# Patient Record
Sex: MALE | Race: WHITE | NOT HISPANIC OR LATINO | URBAN - METROPOLITAN AREA
[De-identification: names, ages, dates, MRNs, and addresses within clinical notes are randomized per-mention and may not be internally consistent; named-entity substitution may affect disease eponyms.]

---

## 2017-01-17 ENCOUNTER — OUTPATIENT (OUTPATIENT)
Dept: OUTPATIENT SERVICES | Facility: HOSPITAL | Age: 57
LOS: 1 days | Discharge: HOME | End: 2017-01-17

## 2017-06-27 DIAGNOSIS — R41.3 OTHER AMNESIA: ICD-10-CM

## 2017-06-27 DIAGNOSIS — E78.5 HYPERLIPIDEMIA, UNSPECIFIED: ICD-10-CM

## 2017-06-27 DIAGNOSIS — R53.81 OTHER MALAISE: ICD-10-CM

## 2018-11-02 ENCOUNTER — OUTPATIENT (OUTPATIENT)
Dept: OUTPATIENT SERVICES | Facility: HOSPITAL | Age: 58
LOS: 1 days | Discharge: HOME | End: 2018-11-02

## 2018-11-02 DIAGNOSIS — M15.9 POLYOSTEOARTHRITIS, UNSPECIFIED: ICD-10-CM

## 2018-11-02 DIAGNOSIS — R35.1 NOCTURIA: ICD-10-CM

## 2018-11-02 DIAGNOSIS — R39.198 OTHER DIFFICULTIES WITH MICTURITION: ICD-10-CM

## 2018-11-02 DIAGNOSIS — E78.5 HYPERLIPIDEMIA, UNSPECIFIED: ICD-10-CM

## 2018-11-02 DIAGNOSIS — J30.9 ALLERGIC RHINITIS, UNSPECIFIED: ICD-10-CM

## 2018-11-02 DIAGNOSIS — C44.91 BASAL CELL CARCINOMA OF SKIN, UNSPECIFIED: ICD-10-CM

## 2018-11-02 DIAGNOSIS — R41.3 OTHER AMNESIA: ICD-10-CM

## 2018-11-02 DIAGNOSIS — H91.92 UNSPECIFIED HEARING LOSS, LEFT EAR: ICD-10-CM

## 2018-11-02 DIAGNOSIS — M51.26 OTHER INTERVERTEBRAL DISC DISPLACEMENT, LUMBAR REGION: ICD-10-CM

## 2019-02-08 ENCOUNTER — OUTPATIENT (OUTPATIENT)
Dept: OUTPATIENT SERVICES | Facility: HOSPITAL | Age: 59
LOS: 1 days | Discharge: HOME | End: 2019-02-08

## 2019-02-08 DIAGNOSIS — J30.9 ALLERGIC RHINITIS, UNSPECIFIED: ICD-10-CM

## 2019-02-08 DIAGNOSIS — R41.3 OTHER AMNESIA: ICD-10-CM

## 2019-02-08 DIAGNOSIS — R39.198 OTHER DIFFICULTIES WITH MICTURITION: ICD-10-CM

## 2019-02-08 DIAGNOSIS — D64.9 ANEMIA, UNSPECIFIED: ICD-10-CM

## 2019-02-08 DIAGNOSIS — R53.83 OTHER FATIGUE: ICD-10-CM

## 2019-02-08 DIAGNOSIS — E78.5 HYPERLIPIDEMIA, UNSPECIFIED: ICD-10-CM

## 2019-02-08 DIAGNOSIS — H91.92 UNSPECIFIED HEARING LOSS, LEFT EAR: ICD-10-CM

## 2019-02-08 DIAGNOSIS — I10 ESSENTIAL (PRIMARY) HYPERTENSION: ICD-10-CM

## 2019-02-08 DIAGNOSIS — C44.91 BASAL CELL CARCINOMA OF SKIN, UNSPECIFIED: ICD-10-CM

## 2020-08-25 ENCOUNTER — INPATIENT (INPATIENT)
Facility: HOSPITAL | Age: 60
LOS: 2 days | Discharge: HOME | End: 2020-08-28
Attending: INTERNAL MEDICINE | Admitting: INTERNAL MEDICINE
Payer: COMMERCIAL

## 2020-08-25 VITALS
HEART RATE: 69 BPM | RESPIRATION RATE: 18 BRPM | OXYGEN SATURATION: 99 % | WEIGHT: 205.03 LBS | SYSTOLIC BLOOD PRESSURE: 162 MMHG | TEMPERATURE: 98 F | DIASTOLIC BLOOD PRESSURE: 75 MMHG

## 2020-08-25 DIAGNOSIS — Z87.81 PERSONAL HISTORY OF (HEALED) TRAUMATIC FRACTURE: Chronic | ICD-10-CM

## 2020-08-25 LAB
ALBUMIN SERPL ELPH-MCNC: 4.6 G/DL — SIGNIFICANT CHANGE UP (ref 3.5–5.2)
ALP SERPL-CCNC: 27 U/L — LOW (ref 30–115)
ALT FLD-CCNC: 21 U/L — SIGNIFICANT CHANGE UP (ref 0–41)
ANION GAP SERPL CALC-SCNC: 11 MMOL/L — SIGNIFICANT CHANGE UP (ref 7–14)
APTT BLD: 29.3 SEC — SIGNIFICANT CHANGE UP (ref 27–39.2)
AST SERPL-CCNC: 24 U/L — SIGNIFICANT CHANGE UP (ref 0–41)
BASOPHILS # BLD AUTO: 0.04 K/UL — SIGNIFICANT CHANGE UP (ref 0–0.2)
BASOPHILS NFR BLD AUTO: 0.7 % — SIGNIFICANT CHANGE UP (ref 0–1)
BILIRUB SERPL-MCNC: 0.8 MG/DL — SIGNIFICANT CHANGE UP (ref 0.2–1.2)
BUN SERPL-MCNC: 18 MG/DL — SIGNIFICANT CHANGE UP (ref 10–20)
CALCIUM SERPL-MCNC: 9.1 MG/DL — SIGNIFICANT CHANGE UP (ref 8.5–10.1)
CHLORIDE SERPL-SCNC: 104 MMOL/L — SIGNIFICANT CHANGE UP (ref 98–110)
CO2 SERPL-SCNC: 24 MMOL/L — SIGNIFICANT CHANGE UP (ref 17–32)
CREAT SERPL-MCNC: 1.1 MG/DL — SIGNIFICANT CHANGE UP (ref 0.7–1.5)
EOSINOPHIL # BLD AUTO: 0.06 K/UL — SIGNIFICANT CHANGE UP (ref 0–0.7)
EOSINOPHIL NFR BLD AUTO: 1.1 % — SIGNIFICANT CHANGE UP (ref 0–8)
ERYTHROCYTE [SEDIMENTATION RATE] IN BLOOD: 8 MM/HR — SIGNIFICANT CHANGE UP (ref 0–10)
GLUCOSE SERPL-MCNC: 114 MG/DL — HIGH (ref 70–99)
HCT VFR BLD CALC: 41.9 % — LOW (ref 42–52)
HGB BLD-MCNC: 13.9 G/DL — LOW (ref 14–18)
IMM GRANULOCYTES NFR BLD AUTO: 0.4 % — HIGH (ref 0.1–0.3)
INR BLD: 0.9 RATIO — SIGNIFICANT CHANGE UP (ref 0.65–1.3)
LYMPHOCYTES # BLD AUTO: 1.93 K/UL — SIGNIFICANT CHANGE UP (ref 1.2–3.4)
LYMPHOCYTES # BLD AUTO: 36.1 % — SIGNIFICANT CHANGE UP (ref 20.5–51.1)
MCHC RBC-ENTMCNC: 30.3 PG — SIGNIFICANT CHANGE UP (ref 27–31)
MCHC RBC-ENTMCNC: 33.2 G/DL — SIGNIFICANT CHANGE UP (ref 32–37)
MCV RBC AUTO: 91.5 FL — SIGNIFICANT CHANGE UP (ref 80–94)
MONOCYTES # BLD AUTO: 0.51 K/UL — SIGNIFICANT CHANGE UP (ref 0.1–0.6)
MONOCYTES NFR BLD AUTO: 9.6 % — HIGH (ref 1.7–9.3)
NEUTROPHILS # BLD AUTO: 2.78 K/UL — SIGNIFICANT CHANGE UP (ref 1.4–6.5)
NEUTROPHILS NFR BLD AUTO: 52.1 % — SIGNIFICANT CHANGE UP (ref 42.2–75.2)
NRBC # BLD: 0 /100 WBCS — SIGNIFICANT CHANGE UP (ref 0–0)
PLATELET # BLD AUTO: 165 K/UL — SIGNIFICANT CHANGE UP (ref 130–400)
POTASSIUM SERPL-MCNC: 4.1 MMOL/L — SIGNIFICANT CHANGE UP (ref 3.5–5)
POTASSIUM SERPL-SCNC: 4.1 MMOL/L — SIGNIFICANT CHANGE UP (ref 3.5–5)
PROT SERPL-MCNC: 7 G/DL — SIGNIFICANT CHANGE UP (ref 6–8)
PROTHROM AB SERPL-ACNC: 10.4 SEC — SIGNIFICANT CHANGE UP (ref 9.95–12.87)
RBC # BLD: 4.58 M/UL — LOW (ref 4.7–6.1)
RBC # FLD: 12.6 % — SIGNIFICANT CHANGE UP (ref 11.5–14.5)
SARS-COV-2 RNA SPEC QL NAA+PROBE: SIGNIFICANT CHANGE UP
SODIUM SERPL-SCNC: 139 MMOL/L — SIGNIFICANT CHANGE UP (ref 135–146)
TROPONIN T SERPL-MCNC: <0.01 NG/ML — SIGNIFICANT CHANGE UP
WBC # BLD: 5.34 K/UL — SIGNIFICANT CHANGE UP (ref 4.8–10.8)
WBC # FLD AUTO: 5.34 K/UL — SIGNIFICANT CHANGE UP (ref 4.8–10.8)

## 2020-08-25 PROCEDURE — 70450 CT HEAD/BRAIN W/O DYE: CPT | Mod: 26,59

## 2020-08-25 PROCEDURE — 99285 EMERGENCY DEPT VISIT HI MDM: CPT

## 2020-08-25 PROCEDURE — 70498 CT ANGIOGRAPHY NECK: CPT | Mod: 26

## 2020-08-25 PROCEDURE — 70496 CT ANGIOGRAPHY HEAD: CPT | Mod: 26

## 2020-08-25 PROCEDURE — 93010 ELECTROCARDIOGRAM REPORT: CPT

## 2020-08-25 PROCEDURE — 70551 MRI BRAIN STEM W/O DYE: CPT | Mod: 26

## 2020-08-25 RX ORDER — ONDANSETRON 8 MG/1
4 TABLET, FILM COATED ORAL ONCE
Refills: 0 | Status: COMPLETED | OUTPATIENT
Start: 2020-08-25 | End: 2020-08-25

## 2020-08-25 RX ORDER — ENOXAPARIN SODIUM 100 MG/ML
40 INJECTION SUBCUTANEOUS DAILY
Refills: 0 | Status: DISCONTINUED | OUTPATIENT
Start: 2020-08-25 | End: 2020-08-27

## 2020-08-25 RX ORDER — SIMVASTATIN 20 MG/1
40 TABLET, FILM COATED ORAL AT BEDTIME
Refills: 0 | Status: DISCONTINUED | OUTPATIENT
Start: 2020-08-25 | End: 2020-08-28

## 2020-08-25 RX ORDER — LOSARTAN POTASSIUM 100 MG/1
50 TABLET, FILM COATED ORAL DAILY
Refills: 0 | Status: DISCONTINUED | OUTPATIENT
Start: 2020-08-25 | End: 2020-08-28

## 2020-08-25 RX ORDER — MECLIZINE HCL 12.5 MG
50 TABLET ORAL ONCE
Refills: 0 | Status: COMPLETED | OUTPATIENT
Start: 2020-08-25 | End: 2020-08-25

## 2020-08-25 RX ADMIN — Medication 50 MILLIGRAM(S): at 08:16

## 2020-08-25 RX ADMIN — ENOXAPARIN SODIUM 40 MILLIGRAM(S): 100 INJECTION SUBCUTANEOUS at 11:53

## 2020-08-25 RX ADMIN — SIMVASTATIN 40 MILLIGRAM(S): 20 TABLET, FILM COATED ORAL at 21:55

## 2020-08-25 RX ADMIN — ONDANSETRON 4 MILLIGRAM(S): 8 TABLET, FILM COATED ORAL at 07:01

## 2020-08-25 NOTE — ED PROVIDER NOTE - PHYSICAL EXAMINATION
CONSTITUTIONAL: well developed, well nourished, in no acute distress, speaking in full sentences, nontoxic appearing  SKIN: warm, dry, no rash  HEAD: normocephalic, atraumatic  EYES: PERRL at 3mm, EOMI, no conjunctival erythema, no nystagmus  ENT: patent airway, moist mucous membranes, no tongue deviation  NECK: supple, no masses, full flexion/extension without pain  CV:  regular rate, regular rhythm, 2+ radial pulses bilaterally  RESP: no wheezes, no rales, no rhonchi, normal work of breathing  ABD: soft, nontender, nondistended, no rebound, no guarding  MSK: normal ROM, no cyanosis, no edema  NEURO: alert, oriented, CN 2-12 grossly intact, mildly decreased sensation to R face/RLE, symmetric sensation in BUE, 5/5 motor strength in all extremities, no pronator drift, no facial asymmetry, normal gait  PSYCH: cooperative, appropriate

## 2020-08-25 NOTE — ED PROVIDER NOTE - OBJECTIVE STATEMENT
59 yo M with PMHx of HLD, HTN who presents with intermittent dizziness, nausea, mild R sided tingling which started at 3am while at work. No alleviating/aggravating factors. No headache, blurry vision, slurred speech, cp, sob, unilateral weakness, recent head trauma. No blood thinners. No hx of CVA or TIA.    PMD: Dr. Bhakta 59 yo M with PMHx of HLD, HTN who presents with intermittent dizziness, nausea, mild R sided tingling which started at 3am while at work. No alleviating/aggravating factors. No headache, blurry vision, slurred speech, cp, sob, vomiting, unilateral weakness, recent head trauma. No blood thinners. No hx of CVA or TIA.    PMD: Dr. Bhakta

## 2020-08-25 NOTE — H&P ADULT - NSHPLABSRESULTS_GEN_ALL_CORE
13.9   5.34  )-----------( 165      ( 25 Aug 2020 06:40 )             41.9       08-25    139  |  104  |  18  ----------------------------<  114<H>  4.1   |  24  |  1.1    Ca    9.1      25 Aug 2020 06:40    TPro  7.0  /  Alb  4.6  /  TBili  0.8  /  DBili  x   /  AST  24  /  ALT  21  /  AlkPhos  27<L>  08-25                  PT/INR - ( 25 Aug 2020 06:40 )   PT: 10.40 sec;   INR: 0.90 ratio         PTT - ( 25 Aug 2020 06:40 )  PTT:29.3 sec    Lactate Trend      CARDIAC MARKERS ( 25 Aug 2020 06:40 )  x     / <0.01 ng/mL / x     / x     / x          < from: CT Angio Neck w/ IV Cont (08.25.20 @ 07:05) >    CTA OF THE HEAD AND NECK    TECHNIQUE:    Following the intravenous administration of 100 cc of Optiray non-ionic contrast, CTA of the head and neck was performed. Sagittal and coronal reformatted images and 3-D volume rendering imageswere submitted for interpretation.    Combined studies combined reports    FINDINGS:.    Neck:    The origins of the great vessels are patent.    The common carotid arteries, external carotid arteries, and vertebral arteries are patent.    The left internal carotid arteries patent. Minimal vascular calcifications are noted along the posterior wall of the proximal left internal carotid artery without vascular stenosis.    There is a short segment of mild stenosis (less than 50%) involving the proximal right internal carotid artery with vascular calcifications along the posterior wall.    Head:    There are no focal areas of narrowing or dilatation of the intracranial arterial circulation.    Other:    Calcifications within the tonsillar pillars consistent with previous inflammation or infection.    Degenerative changes of the cervical spine. 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    IMPRESSION:    1.  Right internal carotid artery;proximal, short segment mild stenosis (less than 50%) with vascular calcifications.    2.  Unremarkable CTA of the head.    < end of copied text >      ?< from: CT Brain Stroke Protocol (08.25.20 @ 06:55) >    INTERPRETATION:  CLINICAL HISTORY / REASON FOR EXAM: Stroke code, intermittent dizziness, right sided tingling    TECHNIQUE: Multiple contiguous axial CT images of the brain were obtained from base to vertex without administration of intravenous contrast. Sagittal and coronal reformatted images were submitted.    COMPARISON: None    FINDINGS:    The third and lateral ventricles are mildly enlarged as are the cortical sulci consistent with a mild degree of cortical atrophy. The fourth ventricle is normal in size and position.    Gray-white differentiation is maintained.    ASPECT SCORE: 10.    There is no shift of the midline structures, depressed skull fracture or acute intracranial hemorrhage.    Mucosal thickening in maxillary sinuses.    IMPRESSION:    No mass effect or intracranial hemorrhage. No CT evidence of acute large territorial infarction    < end of copied text >

## 2020-08-25 NOTE — ED PROVIDER NOTE - PROGRESS NOTE DETAILS
TC: Code stroke activated in triage. TC: 61 yo M with PMHx of HLD, HTN who presents with dizziness, nausea, RUE/RLE tingling sensation since 3am today. Code stroke activated in triage. Here in ED, vitals wnl. Mildly decreased sensation on R face and RLE but sensation symmetric in RUE/LUE. NIHSS 1 for mildly decreased sensation. Ordered labs, ekg, cxr, CT, CTA. Signed out to Dr. Johnston. TC: 61 yo M with PMHx of HLD, HTN who presents with dizziness, nausea, R sided tingling sensation since 3am today. Code stroke activated in triage. Here in ED, vitals wnl. Mildly decreased sensation on R face and RLE but sensation symmetric in RUE/LUE. NIHSS 1 for mildly decreased sensation. Ordered labs, ekg, cxr, CT, CTA. Given zofran for nausea. Signed out to Dr. Johnston. NN: Pt resting comfortably, pending Neuro + CT read TC: Spoke with neuro Dr. Bautista who stated pt is not a tPA candidate given NIHSS 1. If rest of workup negative, likely placement in obs for TIA workup/MRI. Pending official CT/CTA read. Patient reports feeling better, still mildly dizzy, neuroimaging is negative. Patient reports feeling better, still mildly dizzy, neuroimaging is negative. Will try Meclizine. Dr. Bhakta called x3, no answer will transfer care to hospitalist

## 2020-08-25 NOTE — CONSULT NOTE ADULT - ASSESSMENT
61 yo M with PMHx of HLD, HTN presented with intermittent dizziness and numbness more on the right side. Code stroke was called, NIHSS is 1, CTH Is unremarkable CTA shows no LVO. His symptoms are improving.       Plan:  MRI brain NC  TTE  telemetry  Lipid profile, HA1C          Neuroattending note will follow 61 yo M with PMHx of HLD, HTN presented with intermittent dizziness and numbness more on the right side. Code stroke was called, NIHSS is 1, CTH Is unremarkable CTA shows no LVO. His symptoms are improving.       Plan:  MRI brain NC  TTE  telemetry  Lipid profile, HA1C  Aspirin 81          Neuroattending note will follow

## 2020-08-25 NOTE — H&P ADULT - ATTENDING COMMENTS
Patient seen in the ER on August 25. Patient known to me with a PMH of HTN, HLD, presented to the ER from work with dizziness, inability to recall names and gait instability. Work up in progress, awaits MRI and neuro evaluation. Continue supportive measures

## 2020-08-25 NOTE — ED PROVIDER NOTE - ATTENDING CONTRIBUTION TO CARE
61 yo male PMH as noted c/o " difficulties thinking " for 4 days, last night he developed feeling of dizziness worse with change in position, associated with tingling in " everywhere".  Denies any HA, neck pain, CP, SOB, N/V, abdominal pain, no focal weakness or slurring of speech.  Well-appearing male, NAD, PERRL, nml work of breathing, lungs CTA b/l, RRR, well-perfuse extremities, distal pulses intact, abdomen soft, NTND, no leg edema or calf ttp. A&Ox3, nml speech, no focal motor or sensory deficits, no past pointing or pronator drift, no limb ataxia.  Stroke code called on arrival, will follow neurology recommendations regarding work up and disposition.

## 2020-08-25 NOTE — H&P ADULT - ASSESSMENT
61 yo M PMHx HTN, DLD presented for dizziness    # r/o stroke  - NIHSS 1, stroke code called, no need for tPA  - f/u neuro recs  - CT head , CTA head and neck unremarkable for stoke  - f/u MRI   - f/u HbA1c, lipid panel  - PT eval      # DLD  - c/w statin  - f/u lipid panel    #HTN  - monitor BP           # diet : DASH  # DVT ppx: lovenox  # GI ppx: not needed   # full code 61 yo M PMHx HTN, DLD presented for dizziness    # r/o stroke  - NIHSS 1, stroke code called, no need for tPA  - f/u neuro recs  - CT head , CTA head and neck unremarkable for stoke  - f/u MRI   - f/u HbA1c, lipid panel  - PT eval      # DLD  - c/w satin   - f/u lipid panel    #HTN  - c/w losartan 50 OD           # diet : DASH  # DVT ppx: lovenox  # GI ppx: not needed   # full code

## 2020-08-25 NOTE — ED ADULT TRIAGE NOTE - CHIEF COMPLAINT QUOTE
pt with c/o dizziness and numbness/tingling with decreased sensation to right arm & right leg  symptoms started at 3am

## 2020-08-25 NOTE — ED PROVIDER NOTE - NS ED ROS FT
GEN:  no fever, no chills  NEURO:  no headache, + dizziness  ENT: no sore throat, no runny nose  CV:  no chest pain, no palpitations  RESP:  no sob, no cough  GI:  + nausea, no vomiting, no abdominal pain, no diarrhea  :  no dysuria, no urinary frequency, no hematuria  MSK:  no joint pain, no edema  SKIN:  no rash, no bruising  HEME: no hematochezia, no melena

## 2020-08-25 NOTE — ED PROVIDER NOTE - CLINICAL SUMMARY MEDICAL DECISION MAKING FREE TEXT BOX
61 yo male with dizziness and paresthesias, stroke code called, not a candidate for tPA, neuroimaging negative, admit for further work up as recommended by neurology service.

## 2020-08-25 NOTE — CONSULT NOTE ADULT - SUBJECTIVE AND OBJECTIVE BOX
Stroke Consult Note:    MILANA LA    1. Chief Complaint: dizziness, right sided numbness    HPI:  59 yo M with PMHx of HLD, HTN presented with intermittent dizziness. Hx goes back to 3 am this morning when th patient felt dizzy at work , ass/ w/ headache,  nausea and b/l upper and lower ext numbness . He denies any LOC, blurry vision, slurred speech, palpitation, sob, chest pain , weakness. No recent head trauma, no hx of TIA/CVA or seizures .     In ED , Pt was hemodynamically stable. stroke code called , NIHSS 1 , CT head was unremarkable  . CTA  neck showed Right internal carotid artery; proximal, short segment mild stenosis (less than 50%) with vascular calcifications. CTA head was unremarkable. (25 Aug 2020 09:43)      2. Relevant PMH:   Prior ischemic stroke/TIA[ ], Afib [ ], CAD [ ], HTN [x ], DLD [x ], DM [ ], PVD [ ], Obesity [ ],   Sedentary lifestyle [ ], CHF [ ], NORIS [ ], Cancer Hx [ ].    3. Social History: Smoking [ ], Drug Use [ ], Alcohol Use [ ], Other [ ]    4. Possible Location of Stroke:    5. Relevant Brain Tissue Imagin. Relevant Cerebrovascular Imaging:   CT Angio Neck w/ IV Cont:   EXAM:  CT ANGIO NECK (W)AW IC        EXAM:  CT ANGIO BRAIN (W)AW IC            PROCEDURE DATE:  2020            INTERPRETATION:  Clinical History / Reason for exam: Right sided tingling and dizziness, for evaluation of vascular stenosis, possible stroke.    CTA OF THE HEAD AND NECK    TECHNIQUE:    Following the intravenous administration of 100 cc of Optiray non-ionic contrast, CTA of the head and neck was performed. Sagittal and coronal reformatted images and 3-D volume rendering imageswere submitted for interpretation.    Combined studies combined reports    FINDINGS:.    Neck:    The origins of the great vessels are patent.    The common carotid arteries, external carotid arteries, and vertebral arteries are patent.    The left internal carotid arteries patent. Minimal vascular calcifications are noted along the posterior wall of the proximal left internal carotid artery without vascular stenosis.    There is a short segment of mild stenosis (less than 50%) involving the proximal right internal carotid artery with vascular calcifications along the posterior wall.    Head:    There are no focal areas of narrowing or dilatation of the intracranial arterial circulation.    Other:    Calcifications within the tonsillar pillars consistent with previous inflammation or infection.    Degenerative changes of the cervical spine. 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    IMPRESSION:    1.  Right internal carotid artery;proximal, short segment mild stenosis (less than 50%) with vascular calcifications.    2.  Unremarkable CTA of the head.          GONZALO DAVILA M.D., ATTENDING RADIOLOGIST  This document has been electronically signed. Aug 25 2020  7:43AM             (20 @ 07:05)     CT Angio Head w/ IV Cont:   EXAM:  CT ANGIO NECK (W)AW IC        EXAM:  CT ANGIO BRAIN (W)AW IC            PROCEDURE DATE:  2020            INTERPRETATION:  Clinical History / Reason for exam: Right sided tingling and dizziness, for evaluation of vascular stenosis, possible stroke.    CTA OF THE HEAD AND NECK    TECHNIQUE:    Following the intravenous administration of 100 cc of Optiray non-ionic contrast, CTA of the head and neck was performed. Sagittal and coronal reformatted images and 3-D volume rendering imageswere submitted for interpretation.    Combined studies combined reports    FINDINGS:.    Neck:    The origins of the great vessels are patent.    The common carotid arteries, external carotid arteries, and vertebral arteries are patent.    The left internal carotid arteries patent. Minimal vascular calcifications are noted along the posterior wall of the proximal left internal carotid artery without vascular stenosis.    There is a short segment of mild stenosis (less than 50%) involving the proximal right internal carotid artery with vascular calcifications along the posterior wall.    Head:    There are no focal areas of narrowing or dilatation of the intracranial arterial circulation.    Other:    Calcifications within the tonsillar pillars consistent with previous inflammation or infection.    Degenerative changes of the cervical spine. 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    IMPRESSION:    1.  Right internal carotid artery;proximal, short segment mild stenosis (less than 50%) with vascular calcifications.    2.  Unremarkable CTA of the head.          GONZALO DAVILA M.D., ATTENDING RADIOLOGIST  This document has been electronically signed. Aug 25 2020  7:43AM             (20 @ 07:04)            7. Relevant blood tests:      8. Relevant cardiac rhythm monitorin. Relevant Cardiac Structure: (TTE/DEANGELO +/-):[ ]No intracardiac thrombus/[ ] no vegetation/[ ]no [place]/EF:    Home Medications:      MEDICATIONS  (STANDING):  enoxaparin Injectable 40 milliGRAM(s) SubCutaneous daily  losartan 50 milliGRAM(s) Oral daily  simvastatin 40 milliGRAM(s) Oral at bedtime      10. PT/OT/Speech/Rehab/S&Sw/ Cognitive eval results and recommendations:    11. Exam:    Vital Signs Last 24 Hrs  T(C): 36.6 (25 Aug 2020 07:26), Max: 36.6 (25 Aug 2020 06:27)  T(F): 97.8 (25 Aug 2020 07:26), Max: 97.8 (25 Aug 2020 06:27)  HR: 65 (25 Aug 2020 07:26) (64 - 78)  BP: 111/60 (25 Aug 2020 12:17) (111/60 - 162/75)  BP(mean): --  RR: 16 (25 Aug 2020 07:26) (16 - 18)  SpO2: 98% (25 Aug 2020 07:26) (98% - 100%)    12.   NIH STROKE SCALE  Item	                                                        Score  1 a.	Level of Consciousness	               	0  1 b. LOC Questions	                                0  1 c.	LOC Commands	                               	0  2.	Best Gaze	                                        0  3.	Visual	                                                0  4.	Facial Palsy	                                        0  5 a.	Motor Arm - Left	                                0  5 b.	Motor Arm - Right	                        0  6 a.	Motor Leg - Left	                                0  6 b.	Motor Leg - Right	                                0  7.	Limb Ataxia	                                        0  8.	Sensory	                                                1  (subjective)  9.	Language	                                        0  10.	Dysarthria	                                        0  11.	Extinction and Inattention  	        0  ______________________________________  TOTAL	                                                        0    Total NIHSS on admission:      NIHSS yesterday:      NIHSS today: 1    mRS:  0 No symptoms at all  1 No significant disability despite symptoms; able to carry out all usual duties and activities without assistance  2 Slight disability; unable to carry out all previous activities, but able to look after own affairs  3 Moderate disability; requiring some help, but able to walk without assistance  4 Moderately severe disability; unable to walk without assistance and unable to attend to own bodily needs without assistance  5 Severe disability; bedridden, incontinent and requiring constant nursing care and attention  6 Dead      13. Impression:      14. Probable cause/s of Stroke:      15. Suggestions:   Routine stroke workup includin. Disposition:

## 2020-08-25 NOTE — H&P ADULT - HISTORY OF PRESENT ILLNESS
59 yo M with PMHx of HLD, HTN presented with intermittent dizziness. Hx goes back to 3 am this morning when th patient felt dizzy at work , ass/ w/ headache,  nausea and b/l upper and lower ext numbness . He denies any LOC, blurry vision, slurred speech, palpitation, sob, chest pain , weakness. No recent head trauma, no hx of TIA/CVA or seizures .     In ED , Pt was hemodynamically stable. stroke code called , NIHSS 1 , CT head was unremarkable  . CTA  neck showed Right internal carotid artery; proximal, short segment mild stenosis (less than 50%) with vascular calcifications. CTA head was unremarkable.

## 2020-08-26 LAB
A1C WITH ESTIMATED AVERAGE GLUCOSE RESULT: 5.7 % — HIGH (ref 4–5.6)
ALBUMIN SERPL ELPH-MCNC: 4.3 G/DL — SIGNIFICANT CHANGE UP (ref 3.5–5.2)
ALP SERPL-CCNC: 26 U/L — LOW (ref 30–115)
ALT FLD-CCNC: 19 U/L — SIGNIFICANT CHANGE UP (ref 0–41)
ANION GAP SERPL CALC-SCNC: 12 MMOL/L — SIGNIFICANT CHANGE UP (ref 7–14)
AST SERPL-CCNC: 20 U/L — SIGNIFICANT CHANGE UP (ref 0–41)
B BURGDOR C6 AB SER-ACNC: NEGATIVE — SIGNIFICANT CHANGE UP
B BURGDOR IGG+IGM SER-ACNC: 0.12 INDEX — SIGNIFICANT CHANGE UP (ref 0.01–0.89)
BILIRUB SERPL-MCNC: 0.8 MG/DL — SIGNIFICANT CHANGE UP (ref 0.2–1.2)
BUN SERPL-MCNC: 18 MG/DL — SIGNIFICANT CHANGE UP (ref 10–20)
CALCIUM SERPL-MCNC: 9 MG/DL — SIGNIFICANT CHANGE UP (ref 8.5–10.1)
CHLORIDE SERPL-SCNC: 103 MMOL/L — SIGNIFICANT CHANGE UP (ref 98–110)
CHOLEST SERPL-MCNC: 148 MG/DL — SIGNIFICANT CHANGE UP (ref 100–200)
CO2 SERPL-SCNC: 25 MMOL/L — SIGNIFICANT CHANGE UP (ref 17–32)
CREAT SERPL-MCNC: 1 MG/DL — SIGNIFICANT CHANGE UP (ref 0.7–1.5)
CRP SERPL-MCNC: <0.1 MG/DL — SIGNIFICANT CHANGE UP (ref 0–0.4)
ESTIMATED AVERAGE GLUCOSE: 117 MG/DL — HIGH (ref 68–114)
GLUCOSE BLDC GLUCOMTR-MCNC: 115 MG/DL — HIGH (ref 70–99)
GLUCOSE BLDC GLUCOMTR-MCNC: 117 MG/DL — HIGH (ref 70–99)
GLUCOSE SERPL-MCNC: 95 MG/DL — SIGNIFICANT CHANGE UP (ref 70–99)
HCT VFR BLD CALC: 41.2 % — LOW (ref 42–52)
HCV AB S/CO SERPL IA: 0.04 COI — SIGNIFICANT CHANGE UP
HCV AB SERPL-IMP: SIGNIFICANT CHANGE UP
HDLC SERPL-MCNC: 38 MG/DL — LOW
HGB BLD-MCNC: 13.7 G/DL — LOW (ref 14–18)
LIPID PNL WITH DIRECT LDL SERPL: 88 MG/DL — SIGNIFICANT CHANGE UP (ref 4–129)
LYME C6 AB IGG/IGM EIA REFLEX WESTERN BL: SIGNIFICANT CHANGE UP
MCHC RBC-ENTMCNC: 30.6 PG — SIGNIFICANT CHANGE UP (ref 27–31)
MCHC RBC-ENTMCNC: 33.3 G/DL — SIGNIFICANT CHANGE UP (ref 32–37)
MCV RBC AUTO: 92 FL — SIGNIFICANT CHANGE UP (ref 80–94)
NRBC # BLD: 0 /100 WBCS — SIGNIFICANT CHANGE UP (ref 0–0)
PLATELET # BLD AUTO: 165 K/UL — SIGNIFICANT CHANGE UP (ref 130–400)
POTASSIUM SERPL-MCNC: 4.4 MMOL/L — SIGNIFICANT CHANGE UP (ref 3.5–5)
POTASSIUM SERPL-SCNC: 4.4 MMOL/L — SIGNIFICANT CHANGE UP (ref 3.5–5)
PROT SERPL-MCNC: 6.6 G/DL — SIGNIFICANT CHANGE UP (ref 6–8)
RBC # BLD: 4.48 M/UL — LOW (ref 4.7–6.1)
RBC # FLD: 12.8 % — SIGNIFICANT CHANGE UP (ref 11.5–14.5)
SARS-COV-2 IGG SERPL QL IA: NEGATIVE — SIGNIFICANT CHANGE UP
SARS-COV-2 IGM SERPL IA-ACNC: <0.1 INDEX — SIGNIFICANT CHANGE UP
SODIUM SERPL-SCNC: 140 MMOL/L — SIGNIFICANT CHANGE UP (ref 135–146)
TOTAL CHOLESTEROL/HDL RATIO MEASUREMENT: 3.9 RATIO — LOW (ref 4–5.5)
TRIGL SERPL-MCNC: 127 MG/DL — SIGNIFICANT CHANGE UP (ref 10–149)
WBC # BLD: 4.34 K/UL — LOW (ref 4.8–10.8)
WBC # FLD AUTO: 4.34 K/UL — LOW (ref 4.8–10.8)

## 2020-08-26 RX ADMIN — ENOXAPARIN SODIUM 40 MILLIGRAM(S): 100 INJECTION SUBCUTANEOUS at 11:07

## 2020-08-26 RX ADMIN — LOSARTAN POTASSIUM 50 MILLIGRAM(S): 100 TABLET, FILM COATED ORAL at 05:04

## 2020-08-26 RX ADMIN — SIMVASTATIN 40 MILLIGRAM(S): 20 TABLET, FILM COATED ORAL at 22:15

## 2020-08-26 NOTE — PROGRESS NOTE ADULT - ASSESSMENT
ASSESSMENT & PLAN  # 59 yo M PMHx HTN, DLD presented for dizziness    # r/o stroke  - NIHSS 1, stroke code called, no need for tPA  -  Neuro Rec: MRI brain NC, TTE, telemetry, Lipid profile, HA1C, Aspirin 81  - CT head , CTA head and neck unremarkable for stoke  - MRI Head:  No evidence of recent infarct or acute intracranial hemorrhage. Few scattered punctate foci of white matter signal abnormality, nonspecific.  - Echo Pending   - HbA1c pending  -Lipid Profile    Total Cholesterol/HDL Ratio Measurement: 3.9 Ratio    Cholesterol, Serum: 148 mg/dL    Triglycerides, Serum: 127 mg/dL    HDL Cholesterol, Serum: 38: H    Pt w CV RF; NO CVA seen on MR; ? TIA, vs other Non-vascular, possibly inflammatory neuro process  ECHO pending;  Neuro NP to review w Attending    if no further w/u, possible dc later today or tomorrow.

## 2020-08-26 NOTE — PHYSICAL THERAPY INITIAL EVALUATION ADULT - WORK/LEISURE ACTIVITY, REHAB EVAL
Breathing spontaneous and unlabored. Breath sounds clear and equal bilaterally with regular rhythm.
independent

## 2020-08-26 NOTE — PHYSICAL THERAPY INITIAL EVALUATION ADULT - PLANNED THERAPY INTERVENTIONS, PT EVAL
lumbar stabilization/neuromuscular re-education/postural re-education/ROM/stretching/strengthening/gait training/balance training

## 2020-08-26 NOTE — PHYSICAL THERAPY INITIAL EVALUATION ADULT - MANUAL MUSCLE TESTING RESULTS, REHAB EVAL
R hip flexion 4-/5, R knee extension 3+/5, R ankle DF 4+/5, R shoulder flexion 3+/5, LLE WNL, LUE WNL

## 2020-08-26 NOTE — PHYSICAL THERAPY INITIAL EVALUATION ADULT - PERTINENT HX OF CURRENT PROBLEM, REHAB EVAL
Presents to [facility] c c/o dizziness and R-sided decreased sensation at UE and LE. r/o CVA vs. carotid artery stenosis.

## 2020-08-26 NOTE — PHYSICAL THERAPY INITIAL EVALUATION ADULT - CRITERIA FOR SKILLED THERAPEUTIC INTERVENTIONS
impairments found/anticipated discharge recommendation/therapy frequency/rehab potential/predicted duration of therapy intervention/functional limitations in following categories

## 2020-08-26 NOTE — PROGRESS NOTE ADULT - SUBJECTIVE AND OBJECTIVE BOX
Chart reviewed, patient examined. Pertinent results reviewed.  Case discussed with HO; specialist f/u reviewed  HD#1;      SUBJECTIVE  Patient is a 60y old Male who presented with a chief complaint of dizziness (25 Aug 2020 12:40) and intermittent B/L LE/thigh numbness since 4 days PTA  Currently admitted to medicine with the primary diagnosis of Weakness    INTERVAL HPI AND OVERNIGHT EVENTS:  Patient was examined and seen at bedside. This morning he is resting comfortably in bed and reports no issues or overnight events. Overall with no lasting deficits from yesterdays event. No facial droop, no slurred speech, no focal deficits noted on exam. Eating and drinking without difficulty, no difficulty having bowel movements or passing urine. the patient is being followed by neurology who is recommending telemetry monitoring.       REVIEW OF SYMPTOMS:  CONSTITUTIONAL: No weakness, fevers or chills; No headaches  EYES: No visual changes, eye pain, or discharge  ENT: he describes an episode of Vertiginous dizziness and stmbling when he was going to bed 4 d PTA; No ear pain or change in hearing; No sore throat or difficulty swallowing  NECK: No pain or stiffness  RESPIRATORY: No cough, wheezing, or hemoptysis; No shortness of breath  CARDIOVASCULAR: No chest pain or palpitations  GASTROINTESTINAL: No abdominal or epigastric pain; No nausea, vomiting, or hematemesis; No diarrhea or constipation; No melena or hematochezia  GENITOURINARY: No dysuria, frequency or hematuria  MUSCULOSKELETAL: No joint pain, no muscle pain, ? RLE weakness today w PT staff  NEUROLOGICAL: No numbness or weakness at the present time   SKIN: No itching or rashes    MEDICATIONS  (STANDING):  enoxaparin Injectable 40 milliGRAM(s) SubCutaneous daily  losartan 50 milliGRAM(s) Oral daily  simvastatin 40 milliGRAM(s) Oral at bedtime    MEDICATIONS  (PRN):    < from: CT Angio Neck w/ IV Cont (08.25.20 @ 07:05) >  Other:    Calcifications within the tonsillar pillars consistent with previous inflammation or infection.    Degenerative changes of the cervical spine. 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    IMPRESSION:    1.  Right internal carotid artery;proximal, short segment mild stenosis (less than 50%) with vascular calcifications.    2.  Unremarkable CTA of the head.                GONZALO DAVILA M.D., ATTENDING RADIOLOGIST    < end of copied text >  < from: CT Brain Stroke Protocol (08.25.20 @ 06:55) >    EXAM:  CT BRAIN STROKE PROTOCOL            PROCEDURE DATE:  08/25/2020            INTERPRETATION:  CLINICAL HISTORY / REASON FOR EXAM: Stroke code, intermittent dizziness, right sided tingling    TECHNIQUE: Multiple contiguous axial CT images of the brain were obtained from base to vertex without administration of intravenous contrast. Sagittal and coronal reformatted images were submitted.    COMPARISON: None    FINDINGS:    The third and lateral ventricles are mildly enlarged as are the cortical sulci consistent with a mild degree of cortical atrophy. The fourth ventricle is normal in size and position.    Gray-white differentiation is maintained.    ASPECT SCORE: 10.    There is no shift of the midline structures, depressed skull fracture or acute intracranial hemorrhage.    Mucosal thickening in maxillary sinuses.    IMPRESSION:    No mass effect or intracranial hemorrhage. No CT evidence of acute large territorial infarction.      Dr. Jeferson Jerez discussed preliminary findings withJANIE GEORGE [phone] on 8/25/2020 7:12 AM with readback.            JEFERSON JEREZ M.D., RESIDENT RADIOLOGIST  This document has been electronically signed.  GONZALO DAVILA M.D., ATTENDING RADIOLOGIST  This document has been electronically signed. Aug 25 2020  7:42AM                < end of copied text >      Vital Signs Last 24 Hrs  T(C): 36.3 (26 Aug 2020 12:04), Max: 36.5 (25 Aug 2020 21:31)  T(F): 97.3 (26 Aug 2020 12:04), Max: 97.7 (25 Aug 2020 21:31)  HR: 64 (26 Aug 2020 12:04) (52 - 65)  BP: 130/69 (26 Aug 2020 12:04) (123/64 - 135/61)  BP(mean): --  RR: 18 (26 Aug 2020 04:10) (17 - 18)  SpO2: 98% (26 Aug 2020 12:04) (98% - 98%)

## 2020-08-26 NOTE — PHYSICAL THERAPY INITIAL EVALUATION ADULT - GAIT DEVIATIONS NOTED, PT EVAL
Tacrolimus level 13.4 at 12 hours, on 11/6/18  Goal 10-15.   Current dose 0.5 mg in AM, 0.5 mg in PM    No dose change. At goal.     [employer] message sent decreased gabriel/decreased stride length/decreased step length/decreased velocity of limb motion

## 2020-08-26 NOTE — CHART NOTE - NSCHARTNOTEFT_GEN_A_CORE
MILANA AL 60y Male  MRN#: [id number]   Hospital Day: 1d    SUBJECTIVE  Patient is a 60y old Male who presents with a chief complaint of dizziness (25 Aug 2020 12:40)  Currently admitted to medicine with the primary diagnosis of Weakness    INTERVAL HPI AND OVERNIGHT EVENTS:  Patient was examined and seen at bedside. This morning he is resting comfortably in bed and reports no issues or overnight events. Overall with no lasting deficits from yesterdays event. No facial droop, no slurred speech, no focal deficits noted on exam. Eating and drinking without difficulty, no difficulty having bowel movements or passing urine. the patient is being followed by neurology who is recommending telemetry monitoring.       REVIEW OF SYMPTOMS:  CONSTITUTIONAL: No weakness, fevers or chills; No headaches  EYES: No visual changes, eye pain, or discharge  ENT: No vertigo; No ear pain or change in hearing; No sore throat or difficulty swallowing  NECK: No pain or stiffness  RESPIRATORY: No cough, wheezing, or hemoptysis; No shortness of breath  CARDIOVASCULAR: No chest pain or palpitations  GASTROINTESTINAL: No abdominal or epigastric pain; No nausea, vomiting, or hematemesis; No diarrhea or constipation; No melena or hematochezia  GENITOURINARY: No dysuria, frequency or hematuria  MUSCULOSKELETAL: No joint pain, no muscle pain, no weakness  NEUROLOGICAL: No numbness or weakness at the present time   SKIN: No itching or rashes      OBJECTIVE  PAST MEDICAL & SURGICAL HISTORY  Hyperlipidemia  Hypertension  History of fracture of right ankle    ALLERGIES:  No Known Allergies    MEDICATIONS:  STANDING MEDICATIONS  enoxaparin Injectable 40 milliGRAM(s) SubCutaneous daily  losartan 50 milliGRAM(s) Oral daily  simvastatin 40 milliGRAM(s) Oral at bedtime    PRN MEDICATIONS      VITAL SIGNS: Last 24 Hours  T(C): 35.9 (26 Aug 2020 04:10), Max: 36.5 (25 Aug 2020 21:31)  T(F): 96.6 (26 Aug 2020 04:10), Max: 97.7 (25 Aug 2020 21:31)  HR: 65 (26 Aug 2020 04:10) (52 - 65)  BP: 123/64 (26 Aug 2020 04:10) (111/60 - 135/61)  BP(mean): --  RR: 18 (26 Aug 2020 04:10) (17 - 18)  SpO2: 98% (25 Aug 2020 14:24) (98% - 98%)    LABS:                        13.7   4.34  )-----------( 165      ( 26 Aug 2020 07:30 )             41.2     08-26    140  |  103  |  18  ----------------------------<  95  4.4   |  25  |  1.0    Ca    9.0      26 Aug 2020 07:30    TPro  6.6  /  Alb  4.3  /  TBili  0.8  /  DBili  x   /  AST  20  /  ALT  19  /  AlkPhos  26<L>  08-26    PT/INR - ( 25 Aug 2020 06:40 )   PT: 10.40 sec;   INR: 0.90 ratio         PTT - ( 25 Aug 2020 06:40 )  PTT:29.3 sec      Sedimentation Rate, Erythrocyte: 8 mm/Hr (08-25-20 @ 17:06)      CARDIAC MARKERS ( 25 Aug 2020 06:40 )  x     / <0.01 ng/mL / x     / x     / x          RADIOLOGY:      EXAM:  MR BRAIN          PROCEDURE DATE:  08/25/2020      IMPRESSION:    1.  No evidence of recent infarct or acute intracranial hemorrhage.    2.  Few scattered punctate foci of white matter signal abnormality, nonspecific.        CT Angio Neck w/ IV Cont (08.25.20 @ 07:05) >    IMPRESSION:    1.  Right internal carotid artery;proximal, short segment mild stenosis (less than 50%) with vascular calcifications.    2.  Unremarkable CTA of the head.      CT Angio Head w/ IV Cont (08.25.20 @ 07:04) >    IMPRESSION:    1.  Right internal carotid artery; proximal, short segment mild stenosis (less than 50%) with vascular calcifications.    2.  Unremarkable CTA of the head.          PHYSICAL EXAM:  CONSTITUTIONAL: No acute distress, well-developed, well-groomed, AAOx3  HEAD: Atraumatic, normocephalic  EYES: EOM intact, PERRLA, conjunctiva and sclera clear  ENT: Supple, no masses, no thyromegaly, no bruits, no JVD; moist mucous membranes  PULMONARY: Clear to auscultation bilaterally; no wheezes, rales, or rhonchi  CARDIOVASCULAR: Regular rate and rhythm; no murmurs, rubs, or gallops  GASTROINTESTINAL: Soft, non-tender, non-distended; bowel sounds present  MUSCULOSKELETAL: 2+ peripheral pulses; no clubbing, no cyanosis, no edema  NEUROLOGY: non-focal,  on exam 5+ strength in BL LE and upper extremities   SKIN: No rashes or lesions; warm and dry      ASSESSMENT & PLAN  # 59 yo M PMHx HTN, DLD presented for dizziness    # r/o stroke  - NIHSS 1, stroke code called, no need for tPA  -  Neuro Rec: MRI brain NC, TTE, telemetry, Lipid profile, HA1C, Aspirin 81  - CT head , CTA head and neck unremarkable for stoke  - MRI Head:  No evidence of recent infarct or acute intracranial hemorrhage. Few scattered punctate foci of white matter signal abnormality, nonspecific.  - Echo Pending   - HbA1c pending  -Lipid Profile    Total Cholesterol/HDL Ratio Measurement: 3.9 Ratio    Cholesterol, Serum: 148 mg/dL    Triglycerides, Serum: 127 mg/dL    HDL Cholesterol, Serum: 38: H  - PT following       # DLD  - c/w satin atorvastatin 40 mg     #HTN  - c/w losartan 50 OD       # diet : DASH  # DVT ppx: lovenox  # GI ppx: not needed   # full code        Dispo:

## 2020-08-27 LAB
ANION GAP SERPL CALC-SCNC: 10 MMOL/L — SIGNIFICANT CHANGE UP (ref 7–14)
BASOPHILS # BLD AUTO: 0.03 K/UL — SIGNIFICANT CHANGE UP (ref 0–0.2)
BASOPHILS NFR BLD AUTO: 0.7 % — SIGNIFICANT CHANGE UP (ref 0–1)
BUN SERPL-MCNC: 19 MG/DL — SIGNIFICANT CHANGE UP (ref 10–20)
CALCIUM SERPL-MCNC: 9.2 MG/DL — SIGNIFICANT CHANGE UP (ref 8.5–10.1)
CHLORIDE SERPL-SCNC: 100 MMOL/L — SIGNIFICANT CHANGE UP (ref 98–110)
CO2 SERPL-SCNC: 26 MMOL/L — SIGNIFICANT CHANGE UP (ref 17–32)
CREAT SERPL-MCNC: 1.1 MG/DL — SIGNIFICANT CHANGE UP (ref 0.7–1.5)
EOSINOPHIL # BLD AUTO: 0.07 K/UL — SIGNIFICANT CHANGE UP (ref 0–0.7)
EOSINOPHIL NFR BLD AUTO: 1.5 % — SIGNIFICANT CHANGE UP (ref 0–8)
GLUCOSE BLDC GLUCOMTR-MCNC: 105 MG/DL — HIGH (ref 70–99)
GLUCOSE SERPL-MCNC: 99 MG/DL — SIGNIFICANT CHANGE UP (ref 70–99)
HCT VFR BLD CALC: 40.2 % — LOW (ref 42–52)
HGB BLD-MCNC: 13.5 G/DL — LOW (ref 14–18)
IMM GRANULOCYTES NFR BLD AUTO: 0.2 % — SIGNIFICANT CHANGE UP (ref 0.1–0.3)
LYMPHOCYTES # BLD AUTO: 1.52 K/UL — SIGNIFICANT CHANGE UP (ref 1.2–3.4)
LYMPHOCYTES # BLD AUTO: 33.4 % — SIGNIFICANT CHANGE UP (ref 20.5–51.1)
MAGNESIUM SERPL-MCNC: 2 MG/DL — SIGNIFICANT CHANGE UP (ref 1.8–2.4)
MCHC RBC-ENTMCNC: 30.5 PG — SIGNIFICANT CHANGE UP (ref 27–31)
MCHC RBC-ENTMCNC: 33.6 G/DL — SIGNIFICANT CHANGE UP (ref 32–37)
MCV RBC AUTO: 90.7 FL — SIGNIFICANT CHANGE UP (ref 80–94)
MONOCYTES # BLD AUTO: 0.32 K/UL — SIGNIFICANT CHANGE UP (ref 0.1–0.6)
MONOCYTES NFR BLD AUTO: 7 % — SIGNIFICANT CHANGE UP (ref 1.7–9.3)
NEUTROPHILS # BLD AUTO: 2.6 K/UL — SIGNIFICANT CHANGE UP (ref 1.4–6.5)
NEUTROPHILS NFR BLD AUTO: 57.2 % — SIGNIFICANT CHANGE UP (ref 42.2–75.2)
NRBC # BLD: 0 /100 WBCS — SIGNIFICANT CHANGE UP (ref 0–0)
PLATELET # BLD AUTO: 165 K/UL — SIGNIFICANT CHANGE UP (ref 130–400)
POTASSIUM SERPL-MCNC: 4.3 MMOL/L — SIGNIFICANT CHANGE UP (ref 3.5–5)
POTASSIUM SERPL-SCNC: 4.3 MMOL/L — SIGNIFICANT CHANGE UP (ref 3.5–5)
RBC # BLD: 4.43 M/UL — LOW (ref 4.7–6.1)
RBC # FLD: 12.4 % — SIGNIFICANT CHANGE UP (ref 11.5–14.5)
SODIUM SERPL-SCNC: 136 MMOL/L — SIGNIFICANT CHANGE UP (ref 135–146)
WBC # BLD: 4.55 K/UL — LOW (ref 4.8–10.8)
WBC # FLD AUTO: 4.55 K/UL — LOW (ref 4.8–10.8)

## 2020-08-27 PROCEDURE — 93306 TTE W/DOPPLER COMPLETE: CPT | Mod: 26

## 2020-08-27 RX ADMIN — SIMVASTATIN 40 MILLIGRAM(S): 20 TABLET, FILM COATED ORAL at 21:38

## 2020-08-27 RX ADMIN — LOSARTAN POTASSIUM 50 MILLIGRAM(S): 100 TABLET, FILM COATED ORAL at 05:21

## 2020-08-27 RX ADMIN — ENOXAPARIN SODIUM 40 MILLIGRAM(S): 100 INJECTION SUBCUTANEOUS at 12:06

## 2020-08-27 NOTE — PROGRESS NOTE ADULT - ASSESSMENT
ASSESSMENT:  61 yo M PMHx HTN, DLD presented for dizziness    Dizziness, ruled out stroke, ? BPV  - NIHSS 1, stroke code called, no need for tPA  - Neuro Rec: MRI brain NC, TTE, telemetry, Lipid profile, HA1C, Aspirin 81  - CT head , CTA head and neck unremarkable for stoke  - MRI Head: No evidence of recent infarct or acute intracranial hemorrhage. Few scattered punctate foci of white matter signal abnormality, nonspecific.  - await neuro f/u  - check orthostatics    HLD  - lipid panel noted  - continue statin  - LDL 88    HTN  - BP noted  - on Losartan  - check ortho statics    Discharge planning  Allow to ambulate on the unit

## 2020-08-27 NOTE — PROGRESS NOTE ADULT - SUBJECTIVE AND OBJECTIVE BOX
MILANA LA  60y  Male  HPI:  61 yo M with PMHx of HLD, HTN presented with intermittent dizziness. Hx goes back to 3 am this morning when th patient felt dizzy at work , ass/ w/ headache,  nausea and b/l upper and lower ext numbness . He denies any LOC, blurry vision, slurred speech, palpitation, sob, chest pain , weakness. No recent head trauma, no hx of TIA/CVA or seizures .     In ED , Pt was hemodynamically stable. stroke code called , NIHSS 1 , CT head was unremarkable  . CTA  neck showed Right internal carotid artery; proximal, short segment mild stenosis (less than 50%) with vascular calcifications. CTA head was unremarkable. (25 Aug 2020 09:43)    MEDICATIONS  (STANDING):  enoxaparin Injectable 40 milliGRAM(s) SubCutaneous daily  losartan 50 milliGRAM(s) Oral daily  simvastatin 40 milliGRAM(s) Oral at bedtime    MEDICATIONS  (PRN):    INTERVAL EVENTS: Patient seen today without distress, feels better. Results of testing discussed. Await neurology f/u.    T(C): 36.7 (08-27-20 @ 12:40), Max: 36.8 (08-26-20 @ 20:46)  HR: 63 (08-27-20 @ 12:40) (62 - 63)  BP: 117/70 (08-27-20 @ 12:40) (116/67 - 127/67)  RR: 18 (08-27-20 @ 12:40) (18 - 18)  SpO2: 98% (08-26-20 @ 20:29) (98% - 98%)  Wt(kg): --Vital Signs Last 24 Hrs  T(C): 36.7 (27 Aug 2020 12:40), Max: 36.8 (26 Aug 2020 20:46)  T(F): 98.1 (27 Aug 2020 12:40), Max: 98.3 (26 Aug 2020 20:46)  HR: 63 (27 Aug 2020 12:40) (62 - 63)  BP: 117/70 (27 Aug 2020 12:40) (116/67 - 127/67)  BP(mean): --  RR: 18 (27 Aug 2020 12:40) (18 - 18)  SpO2: 98% (26 Aug 2020 20:29) (98% - 98%)    PHYSICAL EXAM:  GENERAL: NAD  NECK: Supple, No JVD  CHEST/LUNG: Clear; No wheezing  HEART: S1, S2, Regular rate and rhythm  ABDOMEN: Soft, Nontender, Nondistended; Bowel sounds present  EXTREMITIES: No  edema    LABS:                        13.5   4.55  )-----------( 165      ( 27 Aug 2020 05:48 )             40.2             08-27    136  |  100  |  19  ----------------------------<  99  4.3   |  26  |  1.1    Ca    9.2      27 Aug 2020 05:48  Mg     2.0     08-27    TPro  6.6  /  Alb  4.3  /  TBili  0.8  /  DBili  x   /  AST  20  /  ALT  19  /  AlkPhos  26<L>  08-26    LIVER FUNCTIONS - ( 26 Aug 2020 07:30 )  Alb: 4.3 g/dL / Pro: 6.6 g/dL / ALK PHOS: 26 U/L / ALT: 19 U/L / AST: 20 U/L / GGT: x                     Lipid Profile in AM (08.26.20 @ 07:30)    Total Cholesterol/HDL Ratio Measurement: 3.9 Ratio    Cholesterol, Serum: 148 mg/dL    Triglycerides, Serum: 127 mg/dL    HDL Cholesterol, Serum: 38: HDL Levels >/= 60 mg/dL are considered beneficial and a "negative" risk  factor.  Effective 08/15/2018: New reference range and interpretive comment. mg/dL    Direct LDL: 88: LDL Cholesterol (mg/dL) --- Interpretive Comment (for adults 18 and over)  Optimal LDL Level may vary based on clinical situation  Below 70                   Ideal for people at very high risk of heart  disease  Below 100                  Ideal for people at risk of heart disease  100 - 129                    [place]  130 - 159                    Borderline high  160 - 189                    High  190 and Above           Very high mg/dL        RADIOLOGY & ADDITIONAL TESTS:  < from: MR Head No Cont (08.25.20 @ 19:07) >  FINDINGS:    The ventricles and cortical sulci are normal in size and configuration.    There are are a few scattered punctate foci of T2/FLAIR signal hyperintensity within the frontal subcortical white matter which are nonspecific in etiology. Diagnostic considerations include chronic microvascular change, demyelination and gliosis. These can also be seen in the setting of migraine headaches.    There is no evidence of acute intracranial hemorrhage, extra-axial fluid collection or midline shift.    There is no diffusion abnormality to suggest acute/subacute infarct. There is normal flow void present withinthe major vascular structures.    There is mild scattered paranasal sinus mucosal thickening and a trace left mastoid effusion.    IMPRESSION:    1.  No evidence of recent infarct or acute intracranial hemorrhage.    2.  Few scattered punctate foci of white matter signal abnormality, nonspecific.              < end of copied text >

## 2020-08-27 NOTE — PROGRESS NOTE ADULT - SUBJECTIVE AND OBJECTIVE BOX
SUBJECTIVE:     LENGTH OF HOSPITAL STAY: 2d  CHIEF COMPLAINT:  Patient is a 60y old  Male who presents with a chief complaint of dizziness  & B/L leg weakness (26 Aug 2020 12:35)      Events over the past 24 hours:  Patient was seen and examined in the bedside this morning. There was     HISTORY OF PRESENTING ILLNESS:    HPI:  59 yo M with PMHx of HLD, HTN presented with intermittent dizziness. Hx goes back to 3 am this morning when th patient felt dizzy at work , ass/ w/ headache,  nausea and b/l upper and lower ext numbness . He denies any LOC, blurry vision, slurred speech, palpitation, sob, chest pain , weakness. No recent head trauma, no hx of TIA/CVA or seizures .     In ED , Pt was hemodynamically stable. stroke code called , NIHSS 1 , CT head was unremarkable  . CTA  neck showed Right internal carotid artery; proximal, short segment mild stenosis (less than 50%) with vascular calcifications. CTA head was unremarkable. (25 Aug 2020 09:43)      PAST MEDICAL & SURGICAL HISTORY  PAST MEDICAL & SURGICAL HISTORY:  Hyperlipidemia  Hypertension  History of fracture of right ankle      REVIEW OF SYSTEMS  CONSTITUTIONAL: fever (-), chills (-), weight loss (-), or fatigue (-)  HEENT: blurring of vision (-), eye pain (-), hearing loss (-), tinnitus (-), vertigo (-), dizziness (-), discharge from orifices (-)  RESPIRATORY: Cough (-), wheezing (-), shortness of breath (-)  CARDIOVASCULAR: Chest pain (-), palpitations (-), dizziness (-), leg swelling (-)  GASTROINTESTINAL: Abdominal pain (-), nausea (-), vomiting (-), diarrhea  (-), constipation (-), melena (-), hematochezia (-).  GENITOURINARY: Dysuria (-), frequency (-), hematuria (-), or incontinence (-)  NEUROLOGICAL: Headaches (-), memory loss (-), behavioral alterations (-), weakness (-), numbness (-), tremors (-), or falls (-)  SKIN: Itching (-), burning (-), or rashes (-)  LYMPH NODES: Enlarged glands (-)  ENDOCRINE: Heat Intolerance (-), Cold intolerance (-), hair loss (-)  MUSCULOSKELETAL: Joint pain (-), swelling (-), muscle (-), back (-), extremity pain (-)  PSYCHIATRIC: Depression (-), anxiety (-), mood swings (-), difficulty sleeping (-)  HEME/LYMPH: Easy bruising (-), bleeding gums (-)  ALLERY AND IMMUNOLOGIC: Hives (-), eczema (-), lips (-), tongue swellings (-)    ALLERGIES:  No Known Allergies      MEDICATIONS:  STANDING MEDICATIONS  enoxaparin Injectable 40 milliGRAM(s) SubCutaneous daily  losartan 50 milliGRAM(s) Oral daily  simvastatin 40 milliGRAM(s) Oral at bedtime    PRN MEDICATIONS        OBJECTIVE:  VITALS:   T(F): 96.8  HR: 62  BP: 116/67  RR: 18  SpO2: 98%    PHYSICAL EXAM:  General: Not in distress; Pallor (-), Icterus (-), Cyanosis (-), Clubbing (-)    HEENT: Pupils equal, round and reactive to light symmetrically, EOM - Normal bilaterally; Hearing - b/l normal; No external discharge noted, JVP - Not elevated, Lymphadenopathy(-)    PULM: Bilaterally equal and clear breath sounds, no wheeze, rubs or crackles.     CVS: Normal S1 and S2, no murmurs, rubs, or gallops.     GI: Soft, nondistended, nontender, no masses.    MSK: Edema (-), no joint or muscle tenderness    SKIN: Warm and well perfused, no rashes noted    NEURO:  Alert, Oriented to time, place, and person; Memory, attention and concentration - Normal; Cranial Nerves - Grossly normal; Normal strength and sensation in all four extremities.    LABS:                        13.5   4.55  )-----------( 165      ( 27 Aug 2020 05:48 )             40.2     08-26    140  |  103  |  18  ----------------------------<  95  4.4   |  25  |  1.0    Ca    9.0      26 Aug 2020 07:30    TPro  6.6  /  Alb  4.3  /  TBili  0.8  /  DBili  x   /  AST  20  /  ALT  19  /  AlkPhos  26<L>  08-26    CT-brain - Stroke protocol (08/25)  No mass effect or intracranial hemorrhage. No CT evidence of acute large territorial infarction.      CTA Head and Neck w/ IV cont (08/25)  1.  Right internal carotid artery;proximal, short segment mild stenosis (less than 50%) with vascular calcifications.  2.  Unremarkable CTA of the head. SUBJECTIVE:     LENGTH OF HOSPITAL STAY: 2d  CHIEF COMPLAINT:  Patient is a 60y old  Male who presents with a chief complaint of dizziness  & B/L leg weakness (26 Aug 2020 12:35)      Events over the past 24 hours:  Patient was seen and examined in the bedside this morning. He was comfortably lying on the bed. There were no acute events overnight. His LE weakness has resolved. He says he still feels dizzy immediately after he stands up from bed.     HISTORY OF PRESENTING ILLNESS:    HPI:  61 yo M with PMHx of HLD, HTN presented with intermittent dizziness. Hx goes back to 3 am this morning when th patient felt dizzy at work , ass/ w/ headache,  nausea and b/l upper and lower ext numbness . He denies any LOC, blurry vision, slurred speech, palpitation, sob, chest pain , weakness. No recent head trauma, no hx of TIA/CVA or seizures .     In ED , Pt was hemodynamically stable. stroke code called , NIHSS 1 , CT head was unremarkable  . CTA  neck showed Right internal carotid artery; proximal, short segment mild stenosis (less than 50%) with vascular calcifications. CTA head was unremarkable. (25 Aug 2020 09:43)      PAST MEDICAL & SURGICAL HISTORY  PAST MEDICAL & SURGICAL HISTORY:  Hyperlipidemia  Hypertension  History of fracture of right ankle      REVIEW OF SYSTEMS  CONSTITUTIONAL: fever (-), chills (-), weight loss (-), or fatigue (-)  HEENT: blurring of vision (-), eye pain (-), hearing loss (-), tinnitus (-), vertigo (-), dizziness (-), discharge from orifices (-)  RESPIRATORY: Cough (-), wheezing (-), shortness of breath (-)  CARDIOVASCULAR: Chest pain (-), palpitations (-), dizziness (-), leg swelling (-)  GASTROINTESTINAL: Abdominal pain (-), nausea (-), vomiting (-), diarrhea  (-), constipation (-), melena (-), hematochezia (-).  GENITOURINARY: Dysuria (-), frequency (-), hematuria (-), or incontinence (-)  NEUROLOGICAL: Headaches (-), memory loss (-), behavioral alterations (-), weakness (-), numbness (-), tremors (-), or falls (-)  SKIN: Itching (-), burning (-), or rashes (-)  LYMPH NODES: Enlarged glands (-)  ENDOCRINE: Heat Intolerance (-), Cold intolerance (-), hair loss (-)  MUSCULOSKELETAL: Joint pain (-), swelling (-), muscle (-), back (-), extremity pain (-)  PSYCHIATRIC: Depression (-), anxiety (-), mood swings (-), difficulty sleeping (-)  HEME/LYMPH: Easy bruising (-), bleeding gums (-)  ALLERY AND IMMUNOLOGIC: Hives (-), eczema (-), lips (-), tongue swellings (-)    ALLERGIES:  No Known Allergies      MEDICATIONS:  STANDING MEDICATIONS  enoxaparin Injectable 40 milliGRAM(s) SubCutaneous daily  losartan 50 milliGRAM(s) Oral daily  simvastatin 40 milliGRAM(s) Oral at bedtime    PRN MEDICATIONS        OBJECTIVE:  VITALS:   T(F): 96.8  HR: 62  BP: 116/67  RR: 18  SpO2: 98%    Orthostatic vitals:       PHYSICAL EXAM:  General: Not in distress; Pallor (-), Icterus (-), Cyanosis (-), Clubbing (-)    HEENT: Pupils equal, round and reactive to light symmetrically, EOM - Normal bilaterally; Hearing - b/l normal; No external discharge noted, JVP - Not elevated, Lymphadenopathy(-)    PULM: Bilaterally equal and clear breath sounds, no wheeze, rubs or crackles.     CVS: Normal S1 and S2, no murmurs, rubs, or gallops.     GI: Soft, nondistended, nontender, no masses.    MSK: Edema (-), no joint or muscle tenderness    SKIN: Warm and well perfused, no rashes noted    NEURO:  Alert, Oriented to time, place, and person; Memory, attention and concentration - Normal; Cranial Nerves - Grossly normal; Normal strength and sensation in all four extremities.    LABS:                        13.5   4.55  )-----------( 165      ( 27 Aug 2020 05:48 )             40.2     08-26    140  |  103  |  18  ----------------------------<  95  4.4   |  25  |  1.0    Ca    9.0      26 Aug 2020 07:30    TPro  6.6  /  Alb  4.3  /  TBili  0.8  /  DBili  x   /  AST  20  /  ALT  19  /  AlkPhos  26<L>  08-26    CT-brain - Stroke protocol (08/25)  No mass effect or intracranial hemorrhage. No CT evidence of acute large territorial infarction.      CTA Head and Neck w/ IV cont (08/25)  1.  Right internal carotid artery;proximal, short segment mild stenosis (less than 50%) with vascular calcifications.  2.  Unremarkable CTA of the head.

## 2020-08-27 NOTE — PROGRESS NOTE ADULT - ASSESSMENT
ASSESSMENT:  # 59 yo M PMHx HTN, DLD presented for dizziness    # r/o stroke  - NIHSS 1, stroke code called, no need for tPA  -  Neuro Rec: MRI brain NC, TTE, telemetry, Lipid profile, HA1C, Aspirin 81  - CT head , CTA head and neck unremarkable for stoke  - MRI Head:  No evidence of recent infarct or acute intracranial hemorrhage. Few scattered punctate foci of white matter signal abnormality, nonspecific.  - Echo Pending   - HbA1c pending  -Lipid Profile    Total Cholesterol/HDL Ratio Measurement: 3.9 Ratio    Cholesterol, Serum: 148 mg/dL    Triglycerides, Serum: 127 mg/dL    HDL Cholesterol, Serum: 38: H    Pt w CV RF; NO CVA seen on MR; ? TIA, vs other Non-vascular, possibly inflammatory neuro process  ECHO pending;  Neuro NP to review w Attending    if no further w/u, possible dc later today or tomorrow.    Attending Attestation:   Plan discussed with pt, HO, Neuro NP.    # Consultant(s) Notes Reviewed:    # Care Discussed with Consultants/Other Providers:            # CODE STATUS:   # DVT PPX:   # GI PPX:   # DISPO: ASSESSMENT:  # 59 yo M PMHx HTN, DLD presented for dizziness    # r/o stroke  - NIHSS 1, stroke code called, no need for tPA  - Neuro Rec: MRI brain NC, TTE, telemetry, Lipid profile, HA1C, Aspirin 81  - CT head , CTA head and neck unremarkable for stoke  - MRI Head: No evidence of recent infarct or acute intracranial hemorrhage. Few scattered punctate foci of white matter signal abnormality, nonspecific.  - Echo Pending  - HbA1c pending - 5.7  -Lipid Profile    Total Cholesterol/HDL Ratio Measurement: 3.9 Ratio    Cholesterol, Serum: 148 mg/dL    Triglycerides, Serum: 127 mg/dL    HDL Cholesterol, Serum: 38: H    Pt w CV RF; NO CVA seen on MR; ? TIA, vs other Non-vascular, possibly inflammatory neuro process  ECHO pending;  Neuro NP to review w Attending    if no further w/u, possible dc later today or tomorrow. ASSESSMENT:  # 59 yo M PMHx HTN, DLD presented for dizziness    # r/o stroke  - NIHSS 1, stroke code called, no need for tPA  - Neuro Rec: MRI brain NC, TTE, telemetry, Lipid profile, HA1C, Aspirin 81  - CT head , CTA head and neck unremarkable for stoke  - MRI Head: No evidence of recent infarct or acute intracranial hemorrhage. Few scattered punctate foci of white matter signal abnormality, nonspecific.  - Echo Pending  - HbA1c pending - 5.7  -Lipid Profile    Total Cholesterol/HDL Ratio Measurement: 3.9 Ratio    Cholesterol, Serum: 148 mg/dL    Triglycerides, Serum: 127 mg/dL    HDL Cholesterol, Serum: 38: H    Pt w CV RF; NO CVA seen on MR; ? TIA, vs other Non-vascular, possibly inflammatory neuro process  ECHO pending;  Neuro NP to review w Attending    if no further w/u, possible dc later today or tomorrow.    # HLD  - lipid panel noted  - continue statin  - LDL 88    # HTN  - BP noted  - on Losartan  - check ortho statics    Discharge planning  Allow to ambulate on the unit ASSESSMENT:  59 yo M PMHx HTN, DLD presented for dizziness    # r/o stroke  - NIHSS 1, stroke code called, no need for tPA  - Neuro Rec: MRI brain NC, TTE, telemetry, Lipid profile, HA1C, Aspirin 81  - CT head , CTA head and neck unremarkable for stoke  - MRI Head: No evidence of recent infarct or acute intracranial hemorrhage. Few scattered punctate foci of white matter signal abnormality, nonspecific.  - HbA1c - 5.7  -Lipid Profile - N  - Pt w CV RF; NO CVA seen on MR; ? TIA, vs other Non-vascular, possibly inflammatory neuro process  -ECHO pending report: ;  -Neuro NP to review w Attending  - if no further w/u, possible dc later today or tomorrow.  -? orthostatic hypotension - check orthostatic vitals    # HLD  - lipid panel noted  - continue statin  - LDL 88    # HTN  - BP noted  - on Losartan  - check ortho statics    Discharge planning  Allow to ambulate on the unit

## 2020-08-28 VITALS
SYSTOLIC BLOOD PRESSURE: 128 MMHG | RESPIRATION RATE: 18 BRPM | TEMPERATURE: 98 F | HEART RATE: 69 BPM | DIASTOLIC BLOOD PRESSURE: 68 MMHG

## 2020-08-28 LAB
ALBUMIN SERPL ELPH-MCNC: 4.3 G/DL — SIGNIFICANT CHANGE UP (ref 3.5–5.2)
ALP SERPL-CCNC: 27 U/L — LOW (ref 30–115)
ALT FLD-CCNC: 20 U/L — SIGNIFICANT CHANGE UP (ref 0–41)
ANION GAP SERPL CALC-SCNC: 8 MMOL/L — SIGNIFICANT CHANGE UP (ref 7–14)
AST SERPL-CCNC: 21 U/L — SIGNIFICANT CHANGE UP (ref 0–41)
BILIRUB SERPL-MCNC: 0.6 MG/DL — SIGNIFICANT CHANGE UP (ref 0.2–1.2)
BUN SERPL-MCNC: 18 MG/DL — SIGNIFICANT CHANGE UP (ref 10–20)
CALCIUM SERPL-MCNC: 8.9 MG/DL — SIGNIFICANT CHANGE UP (ref 8.5–10.1)
CHLORIDE SERPL-SCNC: 103 MMOL/L — SIGNIFICANT CHANGE UP (ref 98–110)
CO2 SERPL-SCNC: 30 MMOL/L — SIGNIFICANT CHANGE UP (ref 17–32)
CREAT SERPL-MCNC: 1.1 MG/DL — SIGNIFICANT CHANGE UP (ref 0.7–1.5)
GLUCOSE SERPL-MCNC: 109 MG/DL — HIGH (ref 70–99)
HCT VFR BLD CALC: 41.2 % — LOW (ref 42–52)
HGB BLD-MCNC: 14 G/DL — SIGNIFICANT CHANGE UP (ref 14–18)
MAGNESIUM SERPL-MCNC: 2 MG/DL — SIGNIFICANT CHANGE UP (ref 1.8–2.4)
MCHC RBC-ENTMCNC: 31.1 PG — HIGH (ref 27–31)
MCHC RBC-ENTMCNC: 34 G/DL — SIGNIFICANT CHANGE UP (ref 32–37)
MCV RBC AUTO: 91.6 FL — SIGNIFICANT CHANGE UP (ref 80–94)
NRBC # BLD: 0 /100 WBCS — SIGNIFICANT CHANGE UP (ref 0–0)
PLATELET # BLD AUTO: 165 K/UL — SIGNIFICANT CHANGE UP (ref 130–400)
POTASSIUM SERPL-MCNC: 4.7 MMOL/L — SIGNIFICANT CHANGE UP (ref 3.5–5)
POTASSIUM SERPL-SCNC: 4.7 MMOL/L — SIGNIFICANT CHANGE UP (ref 3.5–5)
PROT SERPL-MCNC: 6.4 G/DL — SIGNIFICANT CHANGE UP (ref 6–8)
RBC # BLD: 4.5 M/UL — LOW (ref 4.7–6.1)
RBC # FLD: 12.4 % — SIGNIFICANT CHANGE UP (ref 11.5–14.5)
SODIUM SERPL-SCNC: 141 MMOL/L — SIGNIFICANT CHANGE UP (ref 135–146)
WBC # BLD: 4.61 K/UL — LOW (ref 4.8–10.8)
WBC # FLD AUTO: 4.61 K/UL — LOW (ref 4.8–10.8)

## 2020-08-28 PROCEDURE — 99231 SBSQ HOSP IP/OBS SF/LOW 25: CPT

## 2020-08-28 RX ORDER — HEPARIN SODIUM 5000 [USP'U]/ML
5000 INJECTION INTRAVENOUS; SUBCUTANEOUS EVERY 12 HOURS
Refills: 0 | Status: DISCONTINUED | OUTPATIENT
Start: 2020-08-28 | End: 2020-08-28

## 2020-08-28 RX ORDER — LOSARTAN POTASSIUM 100 MG/1
1 TABLET, FILM COATED ORAL
Qty: 0 | Refills: 0 | DISCHARGE

## 2020-08-28 RX ORDER — SIMVASTATIN 20 MG/1
1 TABLET, FILM COATED ORAL
Qty: 0 | Refills: 0 | DISCHARGE

## 2020-08-28 RX ADMIN — LOSARTAN POTASSIUM 50 MILLIGRAM(S): 100 TABLET, FILM COATED ORAL at 05:54

## 2020-08-28 NOTE — PROGRESS NOTE ADULT - SUBJECTIVE AND OBJECTIVE BOX
Neurology Progress Note    Interval History:  patient is examined at the bedside, he is doing well, no current complaints iwth exception of feeling unsteady when he gets up too fast. Otherwise no focal deficits, MRI brain is negative for acute stroke.    HPI:  59 yo M with PMHx of HLD, HTN presented with intermittent dizziness. Hx goes back to 3 am this morning when th patient felt dizzy at work , ass/ w/ headache,  nausea and b/l upper and lower ext numbness . He denies any LOC, blurry vision, slurred speech, palpitation, sob, chest pain , weakness. No recent head trauma, no hx of TIA/CVA or seizures .     In ED , Pt was hemodynamically stable. stroke code called , NIHSS 1 , CT head was unremarkable  . CTA  neck showed Right internal carotid artery; proximal, short segment mild stenosis (less than 50%) with vascular calcifications. CTA head was unremarkable. (25 Aug 2020 09:43)      PAST MEDICAL & SURGICAL HISTORY:  Hyperlipidemia  Hypertension  History of fracture of right ankle          Medications:  heparin   Injectable 5000 Unit(s) SubCutaneous every 12 hours  losartan 50 milliGRAM(s) Oral daily  simvastatin 40 milliGRAM(s) Oral at bedtime      Vital Signs Last 24 Hrs  T(C): 35.7 (28 Aug 2020 05:00), Max: 36.1 (27 Aug 2020 20:41)  T(F): 96.3 (28 Aug 2020 05:00), Max: 97 (27 Aug 2020 20:41)  HR: 65 (28 Aug 2020 05:00) (65 - 68)  BP: 118/58 (28 Aug 2020 05:00) (118/58 - 118/58)  BP(mean): --  RR: 18 (28 Aug 2020 05:00) (18 - 18)  SpO2: 96% (28 Aug 2020 05:00) (96% - 98%)    Neurological Exam:   Mental status: Awake, alert and oriented x3.  Recent and remote memory intact.  Naming, repetition and comprehension intact.  Attention/concentration intact.  No dysarthria, no aphasia.  Fund of knowledge appropriate.    Cranial nerves: Pupils equally round and reactive to light, visual fields full, no nystagmus, extraocular muscles intact, V1 through V3 intact bilaterally and symmetric, face symmetric, hearing intact to finger rub, palate elevation symmetric, tongue was midline.  Motor:  MRC grading 5/5 b/l UE/LE.   strength 5/5.  Normal tone and bulk.  No abnormal movements.    Sensation: Intact to light touch, proprioception, and pinprick.   Coordination: No dysmetria on finger-to-nose and heel-to-shin.  No dysdiadokinesia.  Reflexes: 2+ in bilateral UE/LE, downgoing toes bilaterally. (-) Marie.      Labs:  CBC Full  -  ( 28 Aug 2020 06:50 )  WBC Count : 4.61 K/uL  RBC Count : 4.50 M/uL  Hemoglobin : 14.0 g/dL  Hematocrit : 41.2 %  Platelet Count - Automated : 165 K/uL  Mean Cell Volume : 91.6 fL  Mean Cell Hemoglobin : 31.1 pg  Mean Cell Hemoglobin Concentration : 34.0 g/dL  Auto Neutrophil # : x  Auto Lymphocyte # : x  Auto Monocyte # : x  Auto Eosinophil # : x  Auto Basophil # : x  Auto Neutrophil % : x  Auto Lymphocyte % : x  Auto Monocyte % : x  Auto Eosinophil % : x  Auto Basophil % : x    08-28    141  |  103  |  18  ----------------------------<  109<H>  4.7   |  30  |  1.1    Ca    8.9      28 Aug 2020 06:50  Mg     2.0     08-28    TPro  6.4  /  Alb  4.3  /  TBili  0.6  /  DBili  x   /  AST  21  /  ALT  20  /  AlkPhos  27<L>  08-28    LIVER FUNCTIONS - ( 28 Aug 2020 06:50 )  Alb: 4.3 g/dL / Pro: 6.4 g/dL / ALK PHOS: 27 U/L / ALT: 20 U/L / AST: 21 U/L / GGT: x                 Assessment:   59 yo M with PMHx of HLD, HTN presented with intermittent dizziness and numbness currently resolved. Stroke work up is negative.      Can be d/c from neuro stand point

## 2020-08-28 NOTE — DISCHARGE NOTE NURSING/CASE MANAGEMENT/SOCIAL WORK - PATIENT PORTAL LINK FT
You can access the FollowMyHealth Patient Portal offered by [facility] by registering at the following website: [email or web address]. By joining [facility]’s FollowMyHealth portal, you will also be able to view your health information using other applications (apps) compatible with our system.

## 2020-08-28 NOTE — DISCHARGE NOTE PROVIDER - HOSPITAL COURSE
MILANA LA    MRN-[id number]    [place]:    59 yo M with PMHx of HLD, HTN presented with intermittent dizziness. Hx goes back to 3 am this morning when th patient felt dizzy at work , ass/ w/ headache,  nausea and b/l upper and lower ext numbness . He denies any LOC, blurry vision, slurred speech, palpitation, sob, chest pain , weakness. No recent head trauma, no hx of TIA/CVA or seizures .     He had an ear infection few weeks ago.     In ED , Pt was hemodynamically stable. stroke code called , NIHSS 1 , CT head was unremarkable  . CTA  neck showed Right internal carotid artery; proximal, short segment mild stenosis (less than 50%) with vascular calcifications. CTA head was unremarkable. (25 Aug 2020 09:43)    Hospital course:     Patient was admitted to hospital for evaluation of dizziness and numbness, r/o stroke. His symptoms improved in the hospital.     MRI brain showed: Few scattered punctate foci of white matter signal abnormality, nonspecific.    Neuro consult was done - recommendation: no further workup needed. F/u ENT for possible ENT cause of dizziness.             VITAL SIGNS:    T(F): 96.3 (20 @ 05:00), Max: 98.1 (20 @ 12:40)    HR: 65 (20 @ 05:00)    BP: 118/58 (20 @ 05:00)    SpO2: 96% (20 @ 05:00)    Orthostatic vitals: Lyin/71; 72bpm    standing - 131/64; 72bpm        PHYSICAL EXAMINATION:    General: no acute distress    Head & Neck: no nystagmus, EOMI normal, no ear discharge, hearing jose miguel in bilateral ears    Pulmonary: bilateral equal and clear breath sounds, no wheeze or rubs    Cardiovascular: s1s2 normal, no murmur, rubs or gallops; orthostatic vitals    Gastrointestinal/Abdomen & Pelvis: soft, non tender, no organomegaly    Neurologic/Motor: no motor or sensory deficit noted        TEST RESULTS:                            14.0     4.61  )-----------( 165      ( 28 Aug 2020 06:50 )               41.2                     141  |  103  |  18    ----------------------------<  109<H>    4.7   |  30  |  1.1        Ca    8.9      28 Aug 2020 06:50    Mg     2.0             TPro  6.4  /  Alb  4.3  /  TBili  0.6  /  DBili  x   /  AST  21  /  ALT  20  /  AlkPhos  27<L>   MILANA LA    MRN-[id number]    59 yo M with PMHx of HLD, HTN presented with intermittent dizziness. Hx goes back to 3 am this morning when th patient felt dizzy at work , ass/ w/ headache,  nausea and b/l upper and lower ext numbness . He denies any LOC, blurry vision, slurred speech, palpitation, sob, chest pain , weakness. No recent head trauma, no hx of TIA/CVA or seizures .     He had an ear infection few weeks ago.     In ED , Pt was hemodynamically stable. stroke code called , NIHSS 1 , CT head was unremarkable  . CTA  neck showed Right internal carotid artery; proximal, short segment mild stenosis (less than 50%) with vascular calcifications. CTA head was unremarkable. (25 Aug 2020 09:43)    Hospital course:     Patient was admitted to hospital for evaluation of dizziness and numbness, r/o stroke. His symptoms improved in the hospital.     MRI brain showed: Few scattered punctate foci of white matter signal abnormality, nonspecific.    Neuro consult was done - recommendation: no further workup needed. F/u ENT for possible ENT cause of dizziness.             VITAL SIGNS:    T(F): 96.3 (20 @ 05:00), Max: 98.1 (20 @ 12:40)    HR: 65 (20 @ 05:00)    BP: 118/58 (20 @ 05:00)    SpO2: 96% (20 @ 05:00)    Orthostatic vitals: Lyin/71; 72bpm    standing - 131/64; 72bpm        PHYSICAL EXAMINATION:    General: no acute distress    Head & Neck: no nystagmus, EOMI normal, no ear discharge, hearing jose miguel in bilateral ears    Pulmonary: bilateral equal and clear breath sounds, no wheeze or rubs    Cardiovascular: s1s2 normal, no murmur, rubs or gallops; orthostatic vitals    Gastrointestinal/Abdomen & Pelvis: soft, non tender, no organomegaly    Neurologic/Motor: no motor or sensory deficit noted        TEST RESULTS:                            14.0     4.61  )-----------( 165      ( 28 Aug 2020 06:50 )               41.2                     141  |  103  |  18    ----------------------------<  109<H>    4.7   |  30  |  1.1        Ca    8.9      28 Aug 2020 06:50    Mg     2.0             TPro  6.4  /  Alb  4.3  /  TBili  0.6  /  DBili  x   /  AST  21  /  ALT  20  /  AlkPhos  27<L>

## 2020-08-28 NOTE — DISCHARGE NOTE PROVIDER - NSDCCPCAREPLAN_GEN_ALL_CORE_FT
PRINCIPAL DISCHARGE DIAGNOSIS  Diagnosis: Dizziness  Assessment and Plan of Treatment: Dizziness is a feeling of being off balance or unsteady. Common causes of dizziness are an inner ear fluid imbalance or a lack of oxygen in your blood. Dizziness may be acute (lasts 3 days or less) or chronic (lasts longer than 3 days). You may have dizzy spells that last from seconds to a few hours. You were admitted to hospital to rule out stroke. Your CT Head showed no abnormality. Some abnormalities were seen in your MRI. A neurologist evaluated you and you will not need any further workup for the MRI abnormality for now.   Return to the emergency department if:  You have a headache and a stiff neck.  You have shaking chills and a fever.  You vomit over and over with no relief.  Your vomit or bowel movements are red or black.  You have pain in your chest, back, or abdomen.  You have numbness, especially in your face, arms, or legs.  You have trouble moving your arms or legs.  You are confused.  Contact your healthcare provider if:  You have a fever.  Your symptoms do not get better with treatment.  You have questions or concerns about your condition or care.      SECONDARY DISCHARGE DIAGNOSES  Diagnosis: Hyperlipidemia  Assessment and Plan of Treatment: Hyperlipidemia is a high level of lipids (fats) in your blood. These lipids include cholesterol or triglycerides. Lipids are made by your body. They also come from the foods you eat. Your body needs lipids to work properly, but high levels increase your risk for heart disease, heart attack, and stroke.  Call 911 for any of the following:  You have any of the following signs of a heart attack:  Squeezing, pressure, or pain in your chest  You may also have any of the following:  Discomfort or pain in your back, neck, jaw, stomach, or arm  Shortness of breath  Nausea or vomiting  Lightheadedness or a sudden cold sweat  If You have any of the following signs of a stroke:  Numbness or drooping on one side of your face  Weakness in an arm or leg  Confusion or difficulty speaking  Dizziness, a severe headache, or vision loss    Diagnosis: Hypertension  Assessment and Plan of Treatment: Hypertension  Hypertension, commonly called high blood pressure, is when the force of blood pumping through your arteries is too strong. Hypertension forces your heart to work harder to pump blood. Your arteries may become narrow or stiff. Having untreated or uncontrolled hypertension for a long period of time can cause heart attack, stroke, kidney disease, and other problems. If started on a medication, take exactly as prescribed by your health care professional. Maintain a healthy lifestyle and follow up with your primary care physician.  SEEK IMMEDIATE MEDICAL CARE IF YOU HAVE ANY OF THE FOLLOWING SYMPTOMS: severe headache, confusion, chest pain, abdominal pain, vomiting, or shortness of breath.

## 2020-08-28 NOTE — PROGRESS NOTE ADULT - ATTENDING COMMENTS
Patient seen and examined with ARIK Weller and agree with above.  Reviewed imaging, labs, notes, vitals and meds personally.  Patient not having vertigo but noticing when he gets up quick he becomes lightheaded.  MRI brain results discussed with patient and no acute stroke    Plan as above

## 2020-08-28 NOTE — DISCHARGE NOTE PROVIDER - NSDCMRMEDTOKEN_GEN_ALL_CORE_FT
losartan 50 mg oral tablet: 1 tab(s) orally once a day  simvastatin 40 mg oral tablet: 1 tab(s) orally once a day (at bedtime)

## 2020-08-28 NOTE — PROGRESS NOTE ADULT - SUBJECTIVE AND OBJECTIVE BOX
MILANA LA  60y  Male  HPI:  59 yo M with PMHx of HLD, HTN presented with intermittent dizziness. Hx goes back to 3 am this morning when th patient felt dizzy at work , ass/ w/ headache,  nausea and b/l upper and lower ext numbness . He denies any LOC, blurry vision, slurred speech, palpitation, sob, chest pain , weakness. No recent head trauma, no hx of TIA/CVA or seizures .     In ED , Pt was hemodynamically stable. stroke code called , NIHSS 1 , CT head was unremarkable  . CTA  neck showed Right internal carotid artery; proximal, short segment mild stenosis (less than 50%) with vascular calcifications. CTA head was unremarkable. (25 Aug 2020 09:43)    MEDICATIONS  (STANDING):  heparin   Injectable 5000 Unit(s) SubCutaneous every 12 hours  losartan 50 milliGRAM(s) Oral daily  simvastatin 40 milliGRAM(s) Oral at bedtime    MEDICATIONS  (PRN):    INTERVAL EVENTS: Patient seen today without distress, feels better, dizziness resolving    T(C): 35.7 (08-28-20 @ 05:00), Max: 36.7 (08-27-20 @ 12:40)  HR: 65 (08-28-20 @ 05:00) (63 - 68)  BP: 118/58 (08-28-20 @ 05:00) (117/70 - 118/58)  RR: 18 (08-28-20 @ 05:00) (18 - 18)  SpO2: 96% (08-28-20 @ 05:00) (96% - 98%)  Wt(kg): --Vital Signs Last 24 Hrs  T(C): 35.7 (28 Aug 2020 05:00), Max: 36.7 (27 Aug 2020 12:40)  T(F): 96.3 (28 Aug 2020 05:00), Max: 98.1 (27 Aug 2020 12:40)  HR: 65 (28 Aug 2020 05:00) (63 - 68)  BP: 118/58 (28 Aug 2020 05:00) (117/70 - 118/58)  BP(mean): --  RR: 18 (28 Aug 2020 05:00) (18 - 18)  SpO2: 96% (28 Aug 2020 05:00) (96% - 98%)    PHYSICAL EXAM:  GENERAL: NAD  NECK: Supple, No JVD  CHEST/LUNG: Clear;  HEART: S1, S2, Regular rate and rhythm;   ABDOMEN: Soft, Nontender,  Bowel sounds present  EXTREMITIES: No edema      LABS:  Labs:                        14.0   4.61  )-----------( 165      ( 28 Aug 2020 06:50 )             41.2             08-28    141  |  103  |  18  ----------------------------<  109<H>  4.7   |  30  |  1.1    Ca    8.9      28 Aug 2020 06:50  Mg     2.0     08-28    TPro  6.4  /  Alb  4.3  /  TBili  0.6  /  DBili  x   /  AST  21  /  ALT  20  /  AlkPhos  27<L>  08-28    LIVER FUNCTIONS - ( 28 Aug 2020 06:50 )  Alb: 4.3 g/dL / Pro: 6.4 g/dL / ALK PHOS: 27 U/L / ALT: 20 U/L / AST: 21 U/L / GGT: x                   Sedimentation Rate, Erythrocyte (08.25.20 @ 17:06)    Sedimentation Rate, Erythrocyte: 8 mm/Hr    C-Reactive Protein, Serum (08.25.20 @ 17:06)    C-Reactive Protein, Serum: <0.10: Test Repeated mg/dL    Borrelia burgdorferi IgG/IgM Antibodies (08.25.20 @ 17:06)    LYME IgG/IgM Antibodies Result: 0.12 Index    Lyme C6 Interpretation: Negative: METHOD: Liaison Chemiluminescent Immunoassay      Reference Range: (values expressed as Lyme Index )                                < 0.90        Negative                                0.90 - 1.09   Equivocal                                >= 1.10     Positive  CDC/ASTPHLD Guidelines recommend that all samples judged equivocal or  positive be re-tested by immunoblot for confirmation of results.          < from: Transthoracic Echocardiogram (08.27.20 @ 13:12) >    Summary:   1. LV Ejection Fraction by Armstrong's Method with a biplane EF of 63 %.   2. Normal left atrial size.   3. Normal right atrial size.   4. No evidence of mitral valve regurgitation.   5. Mild tricuspid regurgitation.   6. Color flow doppler and intravenous injection of agitated saline demonstrates the presence of an intact intra atrial septum.    < end of copied text >            RADIOLOGY & ADDITIONAL TESTS:  < from: MR Head No Cont (08.25.20 @ 19:07) >  FINDINGS:    The ventricles and cortical sulci are normal in size and configuration.    There are are a few scattered punctate foci of T2/FLAIR signal hyperintensity within the frontal subcortical white matter which are nonspecific in etiology. Diagnostic considerations include chronic microvascular change, demyelination and gliosis. These can also be seen in the setting of migraine headaches.    There is no evidence of acute intracranial hemorrhage, extra-axial fluid collection or midline shift.    There is no diffusion abnormality to suggest acute/subacute infarct. There is normal flow void present withinthe major vascular structures.    There is mild scattered paranasal sinus mucosal thickening and a trace left mastoid effusion.    IMPRESSION:    1.  No evidence of recent infarct or acute intracranial hemorrhage.    2.  Few scattered punctate foci of white matter signal abnormality, nonspecific.      < end of copied text >        < from: CT Angio Neck w/ IV Cont (08.25.20 @ 07:05) >  FINDINGS:.    Neck:    The origins of the great vessels are patent.    The common carotid arteries, external carotid arteries, and vertebral arteries are patent.    The left internal carotid arteries patent. Minimal vascular calcifications are noted along the posterior wall of the proximal left internal carotid artery without vascular stenosis.    There is a short segment of mild stenosis (less than 50%) involving the proximal right internal carotid artery with vascular calcifications along the posterior wall.    Head:    There are no focal areas of narrowing or dilatation of the intracranial arterial circulation.    Other:    Calcifications within the tonsillar pillars consistent with previous inflammation or infection.    Degenerative changes of the cervical spine. 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    IMPRESSION:    1.  Right internal carotid artery;proximal, short segment mild stenosis (less than 50%) with vascular calcifications.    2.  Unremarkable CTA of the head.    < end of copied text > Patient seen at bedside, no acute events overnight. Dizziness improved.  HPI  59 yo M with PMHx of HLD, HTN presented with intermittent dizziness. Hx goes back to 3 am this morning when th patient felt dizzy at work , ass/ w/ headache,  nausea and b/l upper and lower ext numbness . He denies any LOC, blurry vision, slurred speech, palpitation, sob, chest pain , weakness. No recent head trauma, no hx of TIA/CVA or seizures .     In ED , Pt was hemodynamically stable. stroke code called , NIHSS 1 , CT head was unremarkable  . CTA  neck showed Right internal carotid artery; proximal, short segment mild stenosis (less than 50%) with vascular calcifications. CTA head was unremarkable. (25 Aug 2020 09:43)    MEDICATIONS  (STANDING):  heparin   Injectable 5000 Unit(s) SubCutaneous every 12 hours  losartan 50 milliGRAM(s) Oral daily  simvastatin 40 milliGRAM(s) Oral at bedtime    MEDICATIONS  (PRN):    INTERVAL EVENTS: Patient seen today without distress, feels better, dizziness resolving    T(C): 35.7 (08-28-20 @ 05:00), Max: 36.7 (08-27-20 @ 12:40)  HR: 65 (08-28-20 @ 05:00) (63 - 68)  BP: 118/58 (08-28-20 @ 05:00) (117/70 - 118/58)  RR: 18 (08-28-20 @ 05:00) (18 - 18)  SpO2: 96% (08-28-20 @ 05:00) (96% - 98%)  Wt(kg): --Vital Signs Last 24 Hrs  T(C): 35.7 (28 Aug 2020 05:00), Max: 36.7 (27 Aug 2020 12:40)  T(F): 96.3 (28 Aug 2020 05:00), Max: 98.1 (27 Aug 2020 12:40)  HR: 65 (28 Aug 2020 05:00) (63 - 68)  BP: 118/58 (28 Aug 2020 05:00) (117/70 - 118/58)  BP(mean): --  RR: 18 (28 Aug 2020 05:00) (18 - 18)  SpO2: 96% (28 Aug 2020 05:00) (96% - 98%)    PHYSICAL EXAM:  GENERAL: NAD  NECK: Supple, No JVD  CHEST/LUNG: Clear;  HEART: S1, S2, Regular rate and rhythm;   ABDOMEN: Soft, Nontender,  Bowel sounds present  EXTREMITIES: No edema      LABS:  Labs:                        14.0   4.61  )-----------( 165      ( 28 Aug 2020 06:50 )             41.2             08-28    141  |  103  |  18  ----------------------------<  109<H>  4.7   |  30  |  1.1    Ca    8.9      28 Aug 2020 06:50  Mg     2.0     08-28    TPro  6.4  /  Alb  4.3  /  TBili  0.6  /  DBili  x   /  AST  21  /  ALT  20  /  AlkPhos  27<L>  08-28    LIVER FUNCTIONS - ( 28 Aug 2020 06:50 )  Alb: 4.3 g/dL / Pro: 6.4 g/dL / ALK PHOS: 27 U/L / ALT: 20 U/L / AST: 21 U/L / GGT: x                   Sedimentation Rate, Erythrocyte (08.25.20 @ 17:06)    Sedimentation Rate, Erythrocyte: 8 mm/Hr    C-Reactive Protein, Serum (08.25.20 @ 17:06)    C-Reactive Protein, Serum: <0.10: Test Repeated mg/dL    Borrelia burgdorferi IgG/IgM Antibodies (08.25.20 @ 17:06)    LYME IgG/IgM Antibodies Result: 0.12 Index    Lyme C6 Interpretation: Negative: METHOD: Liaison Chemiluminescent Immunoassay      Reference Range: (values expressed as Lyme Index )                                < 0.90        Negative                                0.90 - 1.09   Equivocal                                >= 1.10     Positive  CDC/ASTPHLD Guidelines recommend that all samples judged equivocal or  positive be re-tested by immunoblot for confirmation of results.          < from: Transthoracic Echocardiogram (08.27.20 @ 13:12) >    Summary:   1. LV Ejection Fraction by Armstrong's Method with a biplane EF of 63 %.   2. Normal left atrial size.   3. Normal right atrial size.   4. No evidence of mitral valve regurgitation.   5. Mild tricuspid regurgitation.   6. Color flow doppler and intravenous injection of agitated saline demonstrates the presence of an intact intra atrial septum.    < end of copied text >            RADIOLOGY & ADDITIONAL TESTS:  < from: MR Head No Cont (08.25.20 @ 19:07) >  FINDINGS:    The ventricles and cortical sulci are normal in size and configuration.    There are are a few scattered punctate foci of T2/FLAIR signal hyperintensity within the frontal subcortical white matter which are nonspecific in etiology. Diagnostic considerations include chronic microvascular change, demyelination and gliosis. These can also be seen in the setting of migraine headaches.    There is no evidence of acute intracranial hemorrhage, extra-axial fluid collection or midline shift.    There is no diffusion abnormality to suggest acute/subacute infarct. There is normal flow void present withinthe major vascular structures.    There is mild scattered paranasal sinus mucosal thickening and a trace left mastoid effusion.    IMPRESSION:    1.  No evidence of recent infarct or acute intracranial hemorrhage.    2.  Few scattered punctate foci of white matter signal abnormality, nonspecific.      < end of copied text >        < from: CT Angio Neck w/ IV Cont (08.25.20 @ 07:05) >  FINDINGS:.    Neck:    The origins of the great vessels are patent.    The common carotid arteries, external carotid arteries, and vertebral arteries are patent.    The left internal carotid arteries patent. Minimal vascular calcifications are noted along the posterior wall of the proximal left internal carotid artery without vascular stenosis.    There is a short segment of mild stenosis (less than 50%) involving the proximal right internal carotid artery with vascular calcifications along the posterior wall.    Head:    There are no focal areas of narrowing or dilatation of the intracranial arterial circulation.    Other:    Calcifications within the tonsillar pillars consistent with previous inflammation or infection.    Degenerative changes of the cervical spine. 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    IMPRESSION:    1.  Right internal carotid artery;proximal, short segment mild stenosis (less than 50%) with vascular calcifications.    2.  Unremarkable CTA of the head.    < end of copied text >

## 2020-08-28 NOTE — PROGRESS NOTE ADULT - ASSESSMENT
ASSESSMENT:  59 yo M PMHx HTN, DLD presented for dizziness    Dizziness, ruled out stroke, ? BPV  - CT head , CTA head and neck unremarkable for stoke  - MRI Head: No evidence of recent infarct or acute intracranial hemorrhage. Few scattered punctate foci of white matter signal abnormality, nonspecific.  - await neuro f/u  - non  orthostatic  - echo normal    HLD  - lipid panel noted  - continue statin  - LDL 88    HTN  - BP noted  - on Losartan    Discharge home today with f/u in office next week prior to returning to work if no further w/u planned by neurology  Allow to ambulate on the unit ASSESSMENT:  61 yo M PMHx HTN, DLD presented for dizziness    # Dizziness, ruled out stroke, ? BPV  - CT head , CTA head and neck unremarkable for stoke  - MRI Head: No evidence of recent infarct or acute intracranial hemorrhage. Few scattered punctate foci of white matter signal abnormality, nonspecific.  - non  orthostatic  - echo normal  - Neuro consult done - No further treatment needed  - h/o ear infection few weeks ago: F/u outpatient ENT for possible vestibular cause of dizziness.     # HLD  - lipid panel noted  - continue statin  - LDL 88    # HTN  - BP noted  - on Losartan    Discharge home today with f/u in office next week prior to returning to work if no further w/u planned by neurology  Allow to ambulate on the unit

## 2020-08-28 NOTE — DISCHARGE NOTE PROVIDER - CARE PROVIDER_API CALL
Preethi Bhakta  [address] [address]  Phone: [phone]  Fax: [phone]  Follow Up Time: [place] [address]  Phone: [phone]  Fax: [phone]  Follow Up Time:     Seth Uribe  OTOLARYNGOLOGY  [address] [place] [address], [address] [place] [address]  Phone: [phone]  Fax: [phone]  Follow Up Time:

## 2020-08-28 NOTE — DISCHARGE NOTE PROVIDER - CARE PROVIDERS DIRECT ADDRESSES
,DirectAddress_Unknown ,DirectAddress_Unknown,[email or web address] the [email or web address]

## 2020-08-28 NOTE — DISCHARGE NOTE PROVIDER - PROVIDER TOKENS
PROVIDER:[TOKEN:[[id number]]] PROVIDER:[TOKEN:[[id number]]],PROVIDER:[TOKEN:[[id number]]]

## 2020-08-30 LAB — B BURGDOR DNA SPEC QL NAA+PROBE: NEGATIVE — SIGNIFICANT CHANGE UP

## 2020-09-01 DIAGNOSIS — R53.1 WEAKNESS: ICD-10-CM

## 2020-09-01 DIAGNOSIS — F17.210 NICOTINE DEPENDENCE, CIGARETTES, UNCOMPLICATED: ICD-10-CM

## 2020-09-01 DIAGNOSIS — I10 ESSENTIAL (PRIMARY) HYPERTENSION: ICD-10-CM

## 2020-09-01 DIAGNOSIS — R42 DIZZINESS AND GIDDINESS: ICD-10-CM

## 2020-09-01 DIAGNOSIS — E78.5 HYPERLIPIDEMIA, UNSPECIFIED: ICD-10-CM
